# Patient Record
Sex: FEMALE | Race: WHITE | Employment: PART TIME | URBAN - METROPOLITAN AREA
[De-identification: names, ages, dates, MRNs, and addresses within clinical notes are randomized per-mention and may not be internally consistent; named-entity substitution may affect disease eponyms.]

---

## 2017-05-25 ENCOUNTER — OFFICE VISIT (OUTPATIENT)
Dept: OBGYN CLINIC | Age: 27
End: 2017-05-25

## 2017-05-25 VITALS
SYSTOLIC BLOOD PRESSURE: 114 MMHG | BODY MASS INDEX: 24.45 KG/M2 | HEIGHT: 63 IN | WEIGHT: 138 LBS | DIASTOLIC BLOOD PRESSURE: 60 MMHG

## 2017-05-25 DIAGNOSIS — Z01.419 ENCOUNTER FOR WELL WOMAN EXAM WITH ROUTINE GYNECOLOGICAL EXAM: Primary | ICD-10-CM

## 2017-05-25 RX ORDER — CHOLECALCIFEROL (VITAMIN D3) 125 MCG
CAPSULE ORAL
COMMUNITY
End: 2018-03-20 | Stop reason: ALTCHOICE

## 2017-05-25 RX ORDER — NORETHINDRONE ACETATE AND ETHINYL ESTRADIOL 1; .02 MG/1; MG/1
1 TABLET ORAL DAILY
Qty: 3 PACKAGE | Refills: 4 | Status: SHIPPED | OUTPATIENT
Start: 2017-05-25 | End: 2018-03-20 | Stop reason: ALTCHOICE

## 2017-05-25 RX ORDER — BUSPIRONE HYDROCHLORIDE 10 MG/1
10 TABLET ORAL 3 TIMES DAILY
COMMUNITY
End: 2018-03-20 | Stop reason: ALTCHOICE

## 2017-05-25 RX ORDER — NORETHINDRONE ACETATE AND ETHINYL ESTRADIOL 1; .02 MG/1; MG/1
TABLET ORAL
COMMUNITY
End: 2017-05-25

## 2017-05-25 RX ORDER — GLUCOSAM/CHONDRO/HERB 149/HYAL 750-100 MG
TABLET ORAL
COMMUNITY
End: 2020-01-24

## 2017-05-25 RX ORDER — PRAZOSIN HYDROCHLORIDE 2 MG/1
2 CAPSULE ORAL
COMMUNITY
End: 2019-06-07

## 2017-05-25 RX ORDER — TRAZODONE HYDROCHLORIDE 50 MG/1
TABLET ORAL
COMMUNITY
End: 2020-11-05

## 2017-05-25 RX ORDER — METHYLPHENIDATE HYDROCHLORIDE 20 MG/1
CAPSULE, EXTENDED RELEASE ORAL
COMMUNITY
End: 2018-03-20 | Stop reason: ALTCHOICE

## 2017-05-25 RX ORDER — LAMOTRIGINE 300 MG/1
TABLET, EXTENDED RELEASE ORAL DAILY
COMMUNITY
End: 2019-06-07

## 2017-05-25 NOTE — PATIENT INSTRUCTIONS
Learning About Birth Control  What is birth control? Birth control is any method used to prevent pregnancy. Another word for birth control is contraception. If you have sex without birth control, there is a chance that you could get pregnant. This is true even if you have not started having periods yet or you are getting close to menopause. The only sure way to prevent pregnancy is to not have sex. But finding a good method of birth control that you are comfortable with can help you avoid an unplanned pregnancy. Be sure to tell your doctor about any health problems you have or medicines you take. He or she can help you choose the birth control method that is right for you. What are the types of birth control? There are many different kinds of birth control. Each has pros and cons. Learning about all the methods will help you find one that is right for you. · Hormonal methods are very good at preventing pregnancy. Combination birth control pills (\"the pill\"), skin patches, and vaginal rings release the hormones estrogen and progestin. Shots, mini-pills, and implants release progestin only. · Intrauterine devices (IUDs) are also very good at preventing pregnancy. A doctor must place the IUD in your uterus. There are two main types of IUDs available, the copper IUD and the hormonal IUD. The hormonal IUD releases progestin. · Barrier methods generally do not prevent pregnancy as well as IUDs or hormonal methods do. Barrier methods include condoms, diaphragms, cervical caps, and sponges. You must use barrier methods every time you have sex. · Natural family planning can work if you and your partner are very careful and you have a regular ovulation cycle. You will need to keep good records so you know when you are most likely to become pregnant (you are fertile). And during times you are fertile, you will need to not have sex or to use a barrier method.  Natural family planning is also known as fertility awareness and the rhythm method. · Permanent birth control (sterilization) gives you lasting protection against pregnancy. A man can have a vasectomy, or a woman can have her tubes tied (tubal ligation) or blocked (tubal implant). But this is only a good choice if you are sure that you don't want any (or any more) children. · Emergency contraception, such as the morning-after pill (Plan B), is a backup method to prevent pregnancy if you forget to use birth control or a condom breaks. You can use emergency contraception for up to 5 days after having had sex, but it works best if you take it right away. It is a good idea to keep emergency birth control on hand as backup protection. You can get emergency contraception without a prescription at most drugstores. How do you choose the best method? The best method of birth control is one that protects you every time you have sex. This usually depends on how well you use it. To find a method that will work best for you, think about:  · How well it works. Think about how important it is to you to avoid pregnancy. Then look at how well each method works. For example, if you plan to have a child soon anyway, you may not need a very reliable method. If you don't want children but feel it is wrong to end a pregnancy, choose a type of birth control that works very well. · How much effort it takes. For example, birth control pills may not be a good choice if you often forget to take medicine. Or, if you are not sure you will stop and use a barrier method each time you have sex, pick another method. · How much the method costs. For example, condoms are cheap or free in some clinics. Some insurance companies cover the cost of prescription birth control. But cost can sometimes be misleading. An IUD costs a lot up front. But it works for years, making it low-cost over time. · Whether it protects you from infection.  Latex condoms can help protect you from sexually transmitted infections (STIs), such as herpes or HIV/AIDS. But they are not the best way to prevent pregnancy. To avoid both STIs and pregnancy, use condoms along with another type of birth control. · Whether you've had a problem with one kind of birth control. Finding the best method of birth control may involve trying something different. Also, you may need to change a method that once worked well for you. · Whether you want children. If you are positive you don't want children, a lasting method of birth control might be best.  · Your health issues. Some birth control methods may not be safe for you, depending on your health issues. For example, women who smoke, are breastfeeding, or have had breast cancer may not be able to use certain methods. How can you get birth control? · You can buy:  ¨ Condoms, sponges, and spermicides without a prescription in drugstores, online, and in many grocery stores. ¨ Emergency contraception without a prescription at most drugstores. · You need to see a doctor to:  ¨ Get a prescription for birth control pills and other methods that use hormones. ¨ Have an IUD or implant inserted. ¨ Be fitted for a diaphragm or cervical cap. Where can you learn more? Go to http://diya-elaine.info/. Enter E760 in the search box to learn more about \"Learning About Birth Control. \"  Current as of: May 30, 2016  Content Version: 11.2  © 5053-4006 [employer] Care instructions adapted under license by [employer] (which disclaims liability or warranty for this information). If you have questions about a medical condition or this instruction, always ask your healthcare professional. [place] [address] any warranty or liability for your use of this information.
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
## 2017-05-30 LAB
CYTOLOGIST CVX/VAG CYTO: NORMAL
CYTOLOGY CVX/VAG DOC THIN PREP: NORMAL
CYTOLOGY HISTORY:: NORMAL
DX ICD CODE: NORMAL
LABCORP, 190119: NORMAL
Lab: NORMAL
OTHER STN SPEC: NORMAL
PATH REPORT.FINAL DX SPEC: NORMAL
STAT OF ADQ CVX/VAG CYTO-IMP: NORMAL

## 2017-07-20 ENCOUNTER — OFFICE VISIT (OUTPATIENT)
Dept: OBGYN CLINIC | Age: 27
End: 2017-07-20

## 2017-07-20 VITALS
SYSTOLIC BLOOD PRESSURE: 92 MMHG | WEIGHT: 137 LBS | DIASTOLIC BLOOD PRESSURE: 58 MMHG | BODY MASS INDEX: 24.27 KG/M2 | HEART RATE: 107 BPM | HEIGHT: 63 IN

## 2017-07-20 DIAGNOSIS — Z30.430 ENCOUNTER FOR IUD INSERTION: Primary | ICD-10-CM

## 2017-07-20 DIAGNOSIS — Z30.432 ENCOUNTER FOR IUD REMOVAL: ICD-10-CM

## 2017-07-20 DIAGNOSIS — Z32.02 NEGATIVE PREGNANCY TEST: ICD-10-CM

## 2017-07-20 DIAGNOSIS — T83.32XA MALPOSITIONED IUD, INITIAL ENCOUNTER: ICD-10-CM

## 2017-07-20 LAB
HCG URINE, QL. (POC): NEGATIVE
VALID INTERNAL CONTROL?: YES

## 2017-07-20 NOTE — PATIENT INSTRUCTIONS
Intrauterine Device (IUD) Insertion: Care Instructions  Your Care Instructions    The intrauterine device (IUD) is a very effective method of birth control. It is a small, plastic, T-shaped device that contains copper or hormones. The doctor inserts the IUD into your uterus. A plastic string tied to the end of the IUD hangs down through the cervix into the vagina. There are two types of IUDs. The copper IUD is effective for up to 10 years. The hormonal IUD is effective for either 3 years or 5 years, depending on which IUD is used. The hormonal IUD also reduces menstrual bleeding and cramping. Both types of IUD damage or kill the man's sperm. This means that the woman's egg does not join with the sperm. IUDs also change the lining of the uterus so that the egg does not lodge there. The IUD is most likely to work well for women who have been pregnant before. Some women who have never been pregnant have more trouble keeping the IUD in the uterus. They also may have more pain and cramping after insertion. Follow-up care is a key part of your treatment and safety. Be sure to make and go to all appointments, and call your doctor if you are having problems. It's also a good idea to know your test results and keep a list of the medicines you take. How can you care for yourself at home? · You may experience some mild cramping and light bleeding (spotting) for 1 or 2 days. Use a hot water bottle or a heating pad set on low on your belly for pain. · Take an over-the-counter pain medicine, such as acetaminophen (Tylenol), ibuprofen (Advil, Motrin), and naproxen (Aleve) if needed. Read and follow all instructions on the label. · Do not take two or more pain medicines at the same time unless the doctor told you to. Many pain medicines have acetaminophen, which is Tylenol. Too much acetaminophen (Tylenol) can be harmful. · Check the string of your IUD after every period.  To do this, insert a finger into your vagina and feel for the cervix, which is at the top of the vagina and feels harder than the rest of your vagina. You should be able to feel the thin, plastic string coming out of the opening of your cervix. If you cannot feel the string, use another form of birth control and make an appointment with your doctor to have the string checked. · If the IUD comes out, save it and call your doctor. Be sure to use another form of birth control while the IUD is out. · Use latex condoms to protect against sexually transmitted infections (STIs), such as gonorrhea and chlamydia. An IUD does not protect you from STIs. Having one sex partner (who does not have STIs and does not have sex with anyone else) is a good way to avoid STIs. When should you call for help? Call 911 anytime you think you may need emergency care. For example, call if:  · You passed out (lost consciousness). · You have sudden, severe pain in your belly or pelvis. Call your doctor now or seek immediate medical care if:  · You have new belly or pelvic pain. · You have severe vaginal bleeding. This means that you are soaking through your usual pads or tampons each hour for 2 or more hours. · You are dizzy or lightheaded, or you feel like you may faint. · You have a fever and pelvic pain or vaginal discharge. · You have pelvic pain that is getting worse. Watch closely for changes in your health, and be sure to contact your doctor if:  · You cannot feel the string, or the IUD comes out. · You feel sick to your stomach, or you vomit. · You think you may be pregnant. Where can you learn more? Go to http://diya-elaine.info/. Enter Z716 in the search box to learn more about \"Intrauterine Device (IUD) Insertion: Care Instructions. \"  Current as of: March 16, 2017  Content Version: 11.3  © 7413-8394 [employer].  Care instructions adapted under license by [employer] (which disclaims liability or warranty for this information). If you have questions about a medical condition or this instruction, always ask your healthcare professional. [place] [address] any warranty or liability for your use of this information.

## 2017-07-20 NOTE — PROGRESS NOTES
H/o menorrhagia, dysmenorrhea. Improved on OCPs but would like IUD. Discussed at [address] in May. Zayra Adam IUD placed, but had significant cramping. US showed IUD in endometrial cavity but in \"Y\" configuration -> removed (see notes below)  Will continue OCPs for now. May consider Nuvaring. Will call if interested in trying insertion again. Would do under US guidance. [facility]  OFFICE PROCEDURE PROGRESS NOTE        Chart reviewed for the following:   Ashli Schwartz, have reviewed the History, Physical and updated the Allergic reactions for [address] performed immediately prior to start of procedure:   I, Fantasma Bustamante, have performed the following reviews on [employer] prior to the start of the procedure:            * Patient was identified by name and date of birth   * Agreement on procedure being performed was verified  * Risks and Benefits explained to the patient  * Procedure site verified and marked as necessary  * Patient was positioned for comfort  * Consent was signed and verified     Time: 1130      Date of procedure: 7/20/2017    Procedure performed by:  Kimani Riley MD    Provider assisted by: ZAYDA Mondragon    Patient assisted by: self    How tolerated by patient: tolerated the procedure well with no complications    Post Procedural Pain Scale: 2 - Hurts Little Bit    Comments: none        KYLEENA IUD INSERTION  Indications:  [employer] is a [de-identified] P5,  32 y.o. female [facility] Patient's last menstrual period was 07/19/2017 (exact date). Her LMP was normal in duration and amount of flow. She  presents for insertion of an IUD. On placebo pills now. The risks, benefits and alternatives of IUD insertion were discussed in detail at last visit. She also has reviewed [facility] information. She has elected to proceed with the insertion today and she states she has no further questions.  A urine pregnancy test was negative   Procedure: The pelvic exam revealed normal external genitalia. On bimanual exam the uterus was midposition and normal in size with no tenderness present. A speculum was inserted into the vagina and the cervix was visualized. The cervix was prepped with zephiran solution. The anterior lip of the cervix was grasped with a single toothed tenaculum after infiltrating with 2cc 1% lidocaine. The uterus was sounded with a Erickson sound to 6.5 centimeters. Shaila Chute was then inserted without difficulty. The string was cut to 4 centimeters. She experienced a moderate  amount of cramping. Post Procedure Status:   She tolerated the procedure with significant discomfort. She continued to have significant cramping. An US was performed which showed the IUD to be in the endometrial cavity, but in a \"Y\" configuration. Recommendation made to remove, she agrees. IUD REMOVAL    Procedure: The patient was placed in a dorsal lithotomy position. A speculum exam was performed and the cervix was visualized. The IUD string was visualized. Using ring forceps , the string was grasped and the IUD removed intact. The IUD was shown to the patient. The patient received KYLEENA lot number [id number] and EXP: 02/2019.

## 2017-09-14 ENCOUNTER — OFFICE VISIT (OUTPATIENT)
Dept: OBGYN CLINIC | Age: 27
End: 2017-09-14

## 2017-09-14 DIAGNOSIS — Z30.430 ENCOUNTER FOR IUD INSERTION: Primary | ICD-10-CM

## 2017-09-14 NOTE — PROGRESS NOTES
Jany Snell inserted in July. Had significant cramping at the time of insertion. US done before pt left office showed IUD intrauterine, but \"Y\" configuration. Removed before she left that day. Returns today to reattempt insertion. Took Advil 800mg 2hr prior. [facility]  OFFICE PROCEDURE PROGRESS NOTE        Chart reviewed for the following:   Clearence Gaucher, have reviewed the History, Physical and updated the Allergic reactions for [address] performed immediately prior to start of procedure:   Ishmael DIAZ, have performed the following reviews on [employer] prior to the start of the procedure:            * Patient was identified by name and date of birth   * Agreement on procedure being performed was verified  * Risks and Benefits explained to the patient  * Procedure site verified and marked as necessary  * Patient was positioned for comfort  * Consent was signed and verified     Time: 9721      Date of procedure: 9/14/2017    Procedure performed by:  Ed Almonte MD    Provider assisted by: ZAYDA Mondragon    Patient assisted by: self    How tolerated by patient: tolerated the procedure well with no complications    Post Procedural Pain Scale: 2 - Hurts Little Bit    Comments: none      KYLEENA IUD INSERTION  Indications:  [employer] is a [de-identified] P5,  32 y.o. female [facility] Patient's last menstrual period was 09/13/2017 (exact date). Her LMP was normal in duration and amount of flow. She  presents for insertion of an IUD. The risks, benefits and alternatives of IUD insertion were discussed in detail at last visit. She also has reviewed [facility] information. She has elected to proceed with the insertion today and she states she has no further questions. Procedure: The pelvic exam revealed normal external genitalia. On bimanual exam the uterus was anteverted and normal in size with no tenderness present.  A speculum was inserted into the vagina and the cervix was visualized. The cervix was prepped with zephiran solution. The anterior lip of the cervix was grasped with a single toothed tenaculum after infiltrating with 2cc 1% lidocaine. The uterus was sounded with a flexible Mirena sound to 6 centimeters. Swetha Ort was then inserted without difficulty under US guidance. The string was cut to 4 centimeters. She experienced a mild  amount of cramping. Post Procedure Status:   She tolerated the procedure with mild discomfort. The patient was observed for 15 minutes after the insertion. There were no complications. On US imaging, IUD within endometrial cavity. Upon final manipulation of 3D image (after pt had left), IUD with slight \"Y\" shape (much less pronounced than previous IUD). Patient was discharged in stable condition. The patient received KYLEENA lot number [id number] and EXP: 02/2019.

## 2017-09-14 NOTE — PATIENT INSTRUCTIONS
Intrauterine Device (IUD) Insertion: Care Instructions  Your Care Instructions    The intrauterine device (IUD) is a very effective method of birth control. It is a small, plastic, T-shaped device that contains copper or hormones. The doctor inserts the IUD into your uterus. A plastic string tied to the end of the IUD hangs down through the cervix into the vagina. There are two types of IUDs. The copper IUD is effective for up to 10 years. The hormonal IUD is effective for either 3 years or 5 years, depending on which IUD is used. The hormonal IUD also reduces menstrual bleeding and cramping. Both types of IUD damage or kill the man's sperm. This means that the woman's egg does not join with the sperm. IUDs also change the lining of the uterus so that the egg does not lodge there. The IUD is most likely to work well for women who have been pregnant before. Some women who have never been pregnant have more trouble keeping the IUD in the uterus. They also may have more pain and cramping after insertion. Follow-up care is a key part of your treatment and safety. Be sure to make and go to all appointments, and call your doctor if you are having problems. It's also a good idea to know your test results and keep a list of the medicines you take. How can you care for yourself at home? · You may experience some mild cramping and light bleeding (spotting) for 1 or 2 days. Use a hot water bottle or a heating pad set on low on your belly for pain. · Take an over-the-counter pain medicine, such as acetaminophen (Tylenol), ibuprofen (Advil, Motrin), and naproxen (Aleve) if needed. Read and follow all instructions on the label. · Do not take two or more pain medicines at the same time unless the doctor told you to. Many pain medicines have acetaminophen, which is Tylenol. Too much acetaminophen (Tylenol) can be harmful. · Check the string of your IUD after every period.  To do this, insert a finger into your vagina and feel for the cervix, which is at the top of the vagina and feels harder than the rest of your vagina. You should be able to feel the thin, plastic string coming out of the opening of your cervix. If you cannot feel the string, use another form of birth control and make an appointment with your doctor to have the string checked. · If the IUD comes out, save it and call your doctor. Be sure to use another form of birth control while the IUD is out. · Use latex condoms to protect against sexually transmitted infections (STIs), such as gonorrhea and chlamydia. An IUD does not protect you from STIs. Having one sex partner (who does not have STIs and does not have sex with anyone else) is a good way to avoid STIs. When should you call for help? Call 911 anytime you think you may need emergency care. For example, call if:  · You passed out (lost consciousness). · You have sudden, severe pain in your belly or pelvis. Call your doctor now or seek immediate medical care if:  · You have new belly or pelvic pain. · You have severe vaginal bleeding. This means that you are soaking through your usual pads or tampons each hour for 2 or more hours. · You are dizzy or lightheaded, or you feel like you may faint. · You have a fever and pelvic pain or vaginal discharge. · You have pelvic pain that is getting worse. Watch closely for changes in your health, and be sure to contact your doctor if:  · You cannot feel the string, or the IUD comes out. · You feel sick to your stomach, or you vomit. · You think you may be pregnant. Where can you learn more? Go to http://diya-elaine.info/. Enter E331 in the search box to learn more about \"Intrauterine Device (IUD) Insertion: Care Instructions. \"  Current as of: March 16, 2017  Content Version: 11.3  © 7161-5390 [employer].  Care instructions adapted under license by [employer] (which disclaims liability or warranty for this information). If you have questions about a medical condition or this instruction, always ask your healthcare professional. [place] [address] any warranty or liability for your use of this information.

## 2017-10-12 ENCOUNTER — OFFICE VISIT (OUTPATIENT)
Dept: OBGYN CLINIC | Age: 27
End: 2017-10-12

## 2017-10-12 VITALS
HEART RATE: 83 BPM | SYSTOLIC BLOOD PRESSURE: 112 MMHG | HEIGHT: 63 IN | DIASTOLIC BLOOD PRESSURE: 66 MMHG | BODY MASS INDEX: 22.15 KG/M2 | WEIGHT: 125 LBS

## 2017-10-12 DIAGNOSIS — Z30.431 SURVEILLANCE OF (INTRAUTERINE) CONTRACEPTIVE DEVICE: Primary | ICD-10-CM

## 2017-10-12 NOTE — PROGRESS NOTES
IUD followup note    This is a follow-up visit for Ranjana Vinson is a [de-identified] ,  32 y.o. female [facility] Patient's last menstrual period was 10/09/2017 (exact date). .     She had an [place] IUD placed on 9/14/17. H/o menorrhagia, dysmenorrhea. [place] placed 7/20/17. Had significant cramping at the time of insertion. US done, showed IUD in endometrial cavity, but in \"Y\" configuration. Removed before she left the office. Returned on 9/14/17 for insertion under [address] guidance. US showed IUD at the fundus, but still with slight \"Y\" configuration. Much less than with initial attempt. She was not having any significant cramping. Since then she has done well. Had spotting. On period now, still a little heavy. Not having cramping now. Ultrasound was done today and revealed appropriate placement of the IUD in the endometrial cavity. Images reviewed. May have very slight \"Y\" shape, but at fundus. UTERUS IS ANTEVERTED, NORMAL IN SIZE AND ECHOGENICITY. ENDOMETRIUM MEASURES 4MM IN THICKNESS. NO EVIDENCE OF MASS OR ABNORMALITY SEEN WITHIN  THE ENDOMETRIAL CAVITY. IUD IS SEEN IN THE PROPER POSITION WITHIN THE ENDOMETRIAL CAVITY IN THE UTERINE FUNDUS. RIGHT OVARY APPEARS WITHIN NORMAL LIMITS. LEFT OVARY APPEARS WITHIN NORMAL LIMITS. NO FREE FLUID SEEN IN THE CDS.     Past Medical History:   Diagnosis Date    Acne     Anxiety     Arm pain     Back pain     Confusion     Depression     Gynecologic exam normal 2017    Dr. Staci Myrick Headache     Memory loss     Neck pain     OAB (overactive bladder)     Resolved     Recurrent UTI April and July 2012    Has symtoms of urgency and frequency in between    Routine Papanicolaou smear 05/25/2017    Negative (no hpv)      Past Surgical History:   Procedure Laterality Date    HX WISDOM TEETH EXTRACTION  2008     Social History     Occupational History    Unemployed Other     Recently graduated     Social History Main Topics    Smoking status: Never Smoker    Smokeless tobacco: Never Used      Comment: Never used vapor or e-cigs     Alcohol use 1.0 oz/week     1 Glasses of wine, 1 Standard drinks or equivalent per week      Comment: wine/week    Drug use: No    Sexual activity: Yes     Partners: Male     Birth control/ protection: Pill     Family History   Problem Relation Age of Onset    Breast Cancer Mother 43    Heart Disease Father     Alcohol abuse Maternal Uncle     Diabetes Maternal Grandmother     Heart Disease Maternal Grandmother     Thyroid Disease Maternal Grandmother     Alcohol abuse Maternal Grandmother     Cancer Maternal Grandfather     Alcohol abuse Maternal Grandfather     Liver Disease Maternal Grandfather     Other Sister      GYN problems    MS Paternal Uncle        Allergies   Allergen Reactions    Levoxyl [Levothyroxine] Swelling     Reports facial swelling     Tylenol [Acetaminophen] Other (comments)     Night terrors     Prior to Admission medications    Medication Sig Start Date End Date Taking? Authorizing Provider   VORTIOXETINE HYDROBROMIDE (TRINTELLIX PO) Take  by mouth. Yes Historical Provider   levonorgestrel [facility]) 17.5 mcg/24 hr (5 years) IUD 1 Device by IntraUTERine route daily. Indications: Office supplied 7/20/17  Yes Isadora Santos MD   methylphenidate (RITALIN LA) 20 mg LA capsule Take by mouth every morning. Yes Historical Provider   CLOMIPRAMINE HCL (CLOMIPRAMINE PO) Take  by mouth. Yes Historical Provider   prazosin (MINIPRESS) 2 mg capsule Take 2 mg by mouth nightly. Yes Historical Provider   traZODone (DESYREL) 50 mg tablet Take  by mouth nightly. Yes Historical Provider   lamoTRIgine (LAMICTAL XR) 300 mg tr24 ER tablet Take  by mouth daily. Yes Historical Provider   cholecalciferol, vitamin D3, (VITAMIN D3) 2,000 unit tab Take  by mouth. Yes Historical Provider   Omega-3-DHA-EPA-Fish Oil (FISH OIL) 1,000 mg (120 mg-180 mg) cap Take  by mouth.    Yes Historical Provider   FLUoxetine (PROZAC) 20 mg tablet Take 1 Tab by mouth daily. 1/18/16  Yes Dinesh Henao NP   clonazePAM (KLONOPIN) 0.5 mg tablet Take 1 Tab by mouth nightly as needed. Max Daily Amount: 0.5 mg. 12/9/15  Yes Dinesh Henao NP   cranberry extract 450 mg Tab Take  by mouth. Yes Historical Provider   lactobacillus rhamnosus gg 10 billion cell (PROBIOTIC) 10 billion cell capsule Take 1 Cap by mouth daily. 10/2/12  Yes Annika Conte MD   busPIRone (BUSPAR) 10 mg tablet Take 10 mg by mouth three (3) times daily. Historical Provider   norethindrone-ethinyl estradiol (JUNEL 1/20, 21,) 1-20 mg-mcg tab Take 1 Tab by mouth daily.  5/25/17   Amelia Holbrook MD          Objective:  Visit Vitals    /66    Pulse 83    Ht 5' 3\" (1.6 m)    Wt 125 lb (56.7 kg)    LMP 10/09/2017 (Exact Date)    BMI 22.14 kg/m2       Physical Exam:   PHYSICAL EXAMINATION    Constitutional  · Appearance: well-nourished, well developed, alert, in no acute distress    Gastrointestinal  · Abdominal Examination: abdomen non-tender to palpation, no masses present  · Liver and spleen: no hepatomegaly present, spleen not palpable  · Hernias: no hernias identified    Genitourinary  · External Genitalia: normal appearance for age, no discharge present, no tenderness present, no inflammatory lesions present, no masses present, no atrophy present  · Vagina: normal vaginal vault without central or paravaginal defects, no discharge present, no inflammatory lesions present, no masses present; scant menstrual blood  · Bladder: non-tender to palpation  · Urethra: appears normal  · Cervix: normal with IUD string visible and appropriate length   · Uterus: normal size, shape and consistency  · Adnexa: no adnexal tenderness present, no adnexal masses present  · Perineum: perineum within normal limits, no evidence of trauma, no rashes or skin lesions present    Skin  · General Inspection: no rash, no lesions identified    Neurologic/Psychiatric  · Mental Status:  · Orientation: grossly oriented to person, place and time  · Mood and Affect: mood normal, affect appropriate    Assessment:  Doing well with expected mild irregular bleeding. IUD at fundus, slight \"Y\" shape? Plan:   RTO for AE as scheduled. Discussed IUD should control sx even if slight \"Y\" shape. No intervention needed unless symptomatic (pain, bleeding).

## 2017-10-12 NOTE — PATIENT INSTRUCTIONS
Learning About Birth Control: Intrauterine Device (IUD)  What is an intrauterine device (IUD)? The intrauterine device (IUD) is used to prevent pregnancy. It's a small, plastic, T-shaped device. Your doctor places the IUD in your uterus. You have a choice between a hormonal IUD and a copper IUD. The hormonal IUD prevents pregnancy by damaging or killing sperm. It also releases a type of the hormone progestin. Progestin prevents pregnancy in these ways: It thickens the mucus in the cervix. This makes it hard for sperm to travel into the uterus. It also thins the lining of the uterus, which makes it harder for a fertilized egg to attach to the uterus. Progestin can sometimes stop the ovaries from releasing an egg each month (ovulation). Hormonal IUDs prevent pregnancy for 3 to 5 years, depending on which IUD is used. Once you have it, you don't have to do anything else to prevent pregnancy. The copper IUD is wrapped in copper wire. Copper IUDs prevent pregnancy by making the uterus and fallopian tubes produce a fluid that kills sperm. The copper IUD prevents pregnancy for 10 years. Once you have it, you don't have to do anything else to prevent pregnancy. A string tied to the end of the IUD hangs down through the opening of the uterus (called the cervix) into the vagina. You can check that the IUD is in place by feeling for the string. The IUD usually stays in the uterus until your doctor removes it. How well does it work? In the first year of use:  · When the hormonal IUD is used exactly as directed, fewer than 1 woman out of 100 has an unplanned pregnancy. · When the copper IUD is used exactly as directed, fewer than 1 woman out of 100 has an unplanned pregnancy. Be sure to tell your doctor about any health problems you have or medicines you take. He or she can help you choose the birth control method that is right for you. What are the advantages of an IUD?   · An IUD is one of the most effective methods of birth control. · It prevents pregnancy for 3 to 10 years, depending on the type. You don't have to worry about birth control during this time. · It's safe to use while breastfeeding. · IUDs don't contain estrogen. So you can use an IUD if you don't want to take estrogen or can't take estrogen because you have certain health problems or concerns. · An IUD is convenient. It is always providing birth control. You don't need to remember to take a pill or get a shot. You don't have to interrupt sex to protect against pregnancy. · A hormonal IUD may reduce heavy bleeding and cramping. What are the disadvantages of an IUD? · An IUD doesn't protect against sexually transmitted infections (STIs), such as herpes or HIV/AIDS. If you aren't sure if your sex partner might have an STI, use a condom to protect against disease. · A copper IUD may cause periods with more bleeding and cramping. · You have to see a doctor to have an IUD inserted and removed. · You have to check to see if the string is in place. Where can you learn more? Go to http://diya-elaine.info/. Enter V944 in the search box to learn more about \"Learning About Birth Control: Intrauterine Device (IUD). \"  Current as of: March 16, 2017  Content Version: 11.3  © 1180-4425 [employer]. Care instructions adapted under license by [employer] (which disclaims liability or warranty for this information). If you have questions about a medical condition or this instruction, always ask your healthcare professional. [place] [address] any warranty or liability for your use of this information.

## 2017-12-27 ENCOUNTER — TELEPHONE (OUTPATIENT)
Dept: OBGYN CLINIC | Age: 27
End: 2017-12-27

## 2017-12-27 NOTE — TELEPHONE ENCOUNTER
Patient is a 32 yr. Old that had a Kyleena placement on 9/14/17. Since the placement she has continued to have heavy bleeding and severe menstrual cramping. She states that in the past she took OCP and did well with them, she just liked the convenience of the IUD. US transvaginal done 10/12/17 and was in proper position. Patient states that Dr. Cherry Likens told her that her cervix was small and that the device barely fit. Do you want to bring patient back in for consultation since it has been passed the 3 month marina or do you have further suggestions? Please advise.

## 2017-12-27 NOTE — TELEPHONE ENCOUNTER
If she wants to have IUD removed, she can make appt just for that and we can switch to OCPs at that visit if she would like. If she wants to try to keep the IUD, then recommend US appt to recheck position. Rec scheduled NSAIDS for 48-72hrs if she hasn't tried this already.

## 2017-12-28 NOTE — TELEPHONE ENCOUNTER
12/28/17- left message on identifiable voicemail the advice by Dr. Christina Wells. Call prn to schedule if decided to do ultrasound.

## 2018-03-20 ENCOUNTER — OFFICE VISIT (OUTPATIENT)
Dept: CARDIOLOGY CLINIC | Age: 28
End: 2018-03-20

## 2018-03-20 VITALS
WEIGHT: 111 LBS | OXYGEN SATURATION: 99 % | HEART RATE: 85 BPM | DIASTOLIC BLOOD PRESSURE: 50 MMHG | RESPIRATION RATE: 16 BRPM | BODY MASS INDEX: 19.67 KG/M2 | SYSTOLIC BLOOD PRESSURE: 100 MMHG | HEIGHT: 63 IN

## 2018-03-20 DIAGNOSIS — E78.00 ELEVATED CHOLESTEROL: Primary | ICD-10-CM

## 2018-03-20 RX ORDER — TEMAZEPAM 30 MG/1
CAPSULE ORAL
COMMUNITY
End: 2020-01-24

## 2018-03-20 RX ORDER — LEVOTHYROXINE SODIUM 75 UG/1
TABLET ORAL
COMMUNITY
End: 2020-01-20 | Stop reason: DRUGHIGH

## 2018-03-20 NOTE — PROGRESS NOTES
Chief Complaint   Patient presents with    New Patient     Elevated chol; past family hx of CAD     1. Have you been to the ER, urgent care clinic since your last visit? Hospitalized since your last visit? No    2. Have you seen or consulted any other health care providers outside of the [address] [place] [address] since your last visit? Include any pap smears or colon screening.  No  Visit Vitals    /50 (BP 1 Location: Right arm, BP Patient Position: Sitting)    Pulse 85    Resp 16    Ht 5' 3\" (1.6 m)    Wt 111 lb (50.3 kg)    LMP 03/20/2018 (LMP Unknown)    SpO2 99%    BMI 19.66 kg/m2
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
## 2018-03-20 NOTE — MR AVS SNAPSHOT
[address] [facility] [address] 
[phone] Patient: Jocelyn Lindquist MRN: [id number] Visit Information Date & Time Provider Department Dept. Phone Encounter #  
 3/20/2018 11:00 AM Subhash Baker MD [facility] Tejada [phone] [id number] Follow-up Instructions Return in about 7 weeks (around 5/8/2018). Your Appointments 5/29/2018 10:10 AM  
ESTABLISHED PATIENT with Ej Bravo MD  
[place] ([address] [place] [address]) Appt Note: ae  
 [address] [facility] [address] [place] [address]  
[phone]  
  
   
 [address] [place] [address]  
  
    
 9/25/2018 11:40 AM  
ESTABLISHED PATIENT with Subhash Baker MD  
[facility] ([facility]) Appt Note: 6 month follow up per Dr. Yareli Branch [address] Upcoming Health Maintenance Date Due DTaP/Tdap/Td series (1 - Tdap) 7/9/2009 Influenza Age 5 to Adult 8/1/2017 PAP AKA CERVICAL CYTOLOGY 5/25/2020 Allergies as of 3/20/2018  Review Complete On: 3/20/2018 By: Subhash Baker MD  
  
 Severity Noted Reaction Type Reactions Levoxyl [Levothyroxine]  05/25/2017    Swelling Reports facial swelling Tylenol [Acetaminophen]  07/13/2012   Side Effect Other (comments) Night terrors Current Immunizations  Never Reviewed No immunizations on file. Not reviewed this visit You Were Diagnosed With   
  
 Codes Comments Elevated cholesterol    -  Primary ICD-10-CM: E78.00 ICD-9-CM: 272.0 Vitals  BP Pulse Resp Height(growth percentile) Weight(growth percentile) LMP  
 100/50 (BP 1 Location: Right arm, BP Patient Position: Sitting) 85 16 5' 3\" (1.6 m) 111 lb (50.3 kg) 03/20/2018 (LMP Unknown) SpO2 BMI OB Status Smoking Status 99% 19.66 kg/m2 IUD Never Smoker Vitals History BMI and BSA Data Body Mass Index Body Surface Area  
 19.66 kg/m 2 1.5 m 2 Preferred Pharmacy Pharmacy Name Phone [address] [facility] [phone] Your Updated Medication List  
  
   
This list is accurate as of 3/20/18 11:56 AM.  Always use your most recent med list.  
  
  
  
  
 clonazePAM 0.5 mg tablet Commonly known as:  Gib Loge Take 1 Tab by mouth nightly as needed. Max Daily Amount: 0.5 mg.  
  
 cranberry extract 450 mg Tab tablet Take  by mouth. FISH OIL 1,000 mg (120 mg-180 mg) capsule Generic drug:  omega 3-DHA-EPA-fish oil Take  by mouth.  
  
 lamoTRIgine 300 mg Tr24 ER tablet Commonly known as: LaMICtal XR Take  by mouth daily. levonorgestrel 17.5 mcg/24 hr (5 years) Iud IUD Commonly known as:  KYLEENA  
1 Device by IntraUTERine route daily. Indications: Office supplied  
  
 prazosin 2 mg capsule Commonly known as:  MINIPRESS Take 2 mg by mouth nightly. PROBIOTIC 10 billion cell capsule Generic drug:  lactobacillus rhamnosus gg 10 billion cell Take 1 Cap by mouth daily. temazepam 30 mg capsule Commonly known as:  RESTORIL Take  by mouth nightly as needed for Sleep.  
  
 traZODone 50 mg tablet Commonly known as:  Abdulaziz Keen Take  by mouth nightly. TRINTELLIX PO Take  by mouth. UNITHROID 75 mcg tablet Generic drug:  levothyroxine Take  by mouth Daily (before breakfast). We Performed the Following AMB POC EKG ROUTINE W/ 12 LEADS, INTER & REP [63221 CPT(R)] Follow-up Instructions Return in about 7 weeks (around 5/8/2018). Introducing [facility]! Dear Terri Valadez: Thank you for requesting a "[employer]" account.   Our records indicate that you already have an active [employer] account. You can access your account anytime at [email or web address] Did you know that you can access your hospital and ER discharge instructions at any time in [employer]? You can also review all of your test results from your hospital stay or ER visit. Additional Information If you have questions, please visit the Frequently Asked Questions section of the [employer] website at [email or web address] Remember, [employer] is NOT to be used for urgent needs. For medical emergencies, dial 911. Now available from your iPhone and Android! Please provide this summary of care documentation to your next provider. Your primary care clinician is listed as Radha Bucio. If you have any questions after today's visit, please call [phone].

## 2018-06-06 ENCOUNTER — OFFICE VISIT (OUTPATIENT)
Dept: OBGYN CLINIC | Age: 28
End: 2018-06-06

## 2018-06-06 VITALS
DIASTOLIC BLOOD PRESSURE: 62 MMHG | WEIGHT: 107 LBS | SYSTOLIC BLOOD PRESSURE: 100 MMHG | HEART RATE: 87 BPM | HEIGHT: 63 IN | BODY MASS INDEX: 18.96 KG/M2

## 2018-06-06 DIAGNOSIS — Z11.3 SCREENING FOR VENEREAL DISEASE: ICD-10-CM

## 2018-06-06 DIAGNOSIS — Z01.419 ENCOUNTER FOR WELL WOMAN EXAM WITH ROUTINE GYNECOLOGICAL EXAM: Primary | ICD-10-CM

## 2018-06-06 DIAGNOSIS — Z30.431 SURVEILLANCE OF (INTRAUTERINE) CONTRACEPTIVE DEVICE: ICD-10-CM
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
## 2018-06-06 NOTE — PATIENT INSTRUCTIONS

## 2018-06-08 LAB
C TRACH RRNA SPEC QL NAA+PROBE: NEGATIVE
HBV SURFACE AG SERPL QL IA: NEGATIVE
HCV AB S/CO SERPL IA: <0.1 S/CO RATIO (ref 0–0.9)
HIV 1+2 AB+HIV1 P24 AG SERPL QL IA: NON REACTIVE
HSV1 IGG SER IA-ACNC: <0.91 INDEX (ref 0–0.9)
HSV2 IGG SER IA-ACNC: <0.91 INDEX (ref 0–0.9)
N GONORRHOEA RRNA SPEC QL NAA+PROBE: NEGATIVE
T PALLIDUM AB SER QL IA: NEGATIVE
T VAGINALIS RRNA SPEC QL NAA+PROBE: NEGATIVE

## 2018-08-29 ENCOUNTER — TELEPHONE (OUTPATIENT)
Dept: OBGYN CLINIC | Age: 28
End: 2018-08-29

## 2018-08-29 NOTE — TELEPHONE ENCOUNTER
Can use OTC Monistat 3 or 7. What abx is she taking? If she is doing a full course of abx, she may need to repeat a course of Monistat once she is done with the abx.

## 2018-08-29 NOTE — TELEPHONE ENCOUNTER
Patient is calling to report that she has a lot of vaginal itching and discomfort. No discharge. The itching started about 2 days ago. She is taking an antibiotic now for post coital intercourse and she has recently started them back up. Patient requested appointment with you today or to get advice on what you would try. No appointments are available with you for remainder of week. Patient has not tried otc product. Monistat 7 day okay to try, patient wanted me to ask.

## 2018-08-29 NOTE — TELEPHONE ENCOUNTER
I spoke with patient and she agreed that she will try the Monistat 3. She was advised that the 3 day may not be enough treatment that some people do better with the 7 day. She was also informed about probiotics. She is taking Macrobid 100 mg post coital.  She said that she will try to rid of the yeast this time and try it again to see if it returns. She got the Macrobid for post coital usage by her PCP. She said if the yeast returns she will consult with you about change of med.

## 2018-08-29 NOTE — TELEPHONE ENCOUNTER
2:00 patient called back with follow-up question    Spoke with patient and around the labia internally she found a swollen area that she can not tell much about right now since she is at work. She said it feels like a little lumpy area. Advised doing warm soaks in plain bath water for several days and if not improving, needs to be seen.

## 2018-08-31 ENCOUNTER — TELEPHONE (OUTPATIENT)
Dept: OBGYN CLINIC | Age: 28
End: 2018-08-31

## 2018-08-31 ENCOUNTER — OFFICE VISIT (OUTPATIENT)
Dept: OBGYN CLINIC | Age: 28
End: 2018-08-31

## 2018-08-31 VITALS
DIASTOLIC BLOOD PRESSURE: 64 MMHG | BODY MASS INDEX: 18.89 KG/M2 | HEIGHT: 63 IN | WEIGHT: 106.6 LBS | SYSTOLIC BLOOD PRESSURE: 107 MMHG

## 2018-08-31 DIAGNOSIS — Z11.3 SCREEN FOR STD (SEXUALLY TRANSMITTED DISEASE): ICD-10-CM

## 2018-08-31 DIAGNOSIS — N90.89 VULVAR IRRITATION: ICD-10-CM

## 2018-08-31 DIAGNOSIS — N90.89 VULVAR LESION: Primary | ICD-10-CM

## 2018-08-31 LAB
PH BODY FLUID, POCT, PHBFPOCT: 4.5
SOURCE POCT, SRCEPOCT: NORMAL
WET MOUNT POCT, WMPOCT: NEGATIVE

## 2018-08-31 RX ORDER — BISMUTH SUBSALICYLATE 262 MG
1 TABLET,CHEWABLE ORAL DAILY
COMMUNITY

## 2018-08-31 NOTE — TELEPHONE ENCOUNTER
Patient calling stating that she has a left labial lump/bump that is tender and no relief with sitz baths and wanted to be seen.     Patient placed on DM's schedule for eval.

## 2018-08-31 NOTE — PATIENT INSTRUCTIONS
Vulvar Dermatitis: Care Instructions  Your Care Instructions  Vulvar dermatitis happens when the soft folds of skin around the opening of the vagina become red, painful, and itchy. Dermatitis can be caused by heat or wetness or can be a reaction to scented soaps, powders, creams, toilet paper, spermicides, or clothing. A skin condition, such as eczema, also can cause dermatitis. Your doctor may do tests to find out what is causing your symptoms. You can treat symptoms of vulvar dermatitis with medicine and home treatment. Try not to scratch your rash. Scratching can make the rash last longer or get worse. Follow-up care is a key part of your treatment and safety. Be sure to make and go to all appointments, and call your doctor if you are having problems. It's also a good idea to know your test results and keep a list of the medicines you take. How can you care for yourself at home? · Use your medicine exactly as prescribed. Call your doctor if you think you are having a problem with your medicine. Tell your doctor if you are taking other prescription or over-the-counter medicines. · Wash your vaginal area no more than once a day. Use cool water with or without mild soap. Pat dry or use a hair dryer on a low setting. · Do not have sex until you feel better. · Do not douche or use powders or sprays on your vulvar area. · Try not to scratch. Use a cold pack or a cool bath to treat itching. · For severe itching, try an oral antihistamine, such as a nondrowsy one like loratadine (Claritin) or one that might make you sleepy, like diphenhydramine (Benadryl). Read and follow all instructions on the label. · Try sleeping without underwear. · Wear loose cotton clothing. Do not wear nylon or other materials that hold body heat and moisture close to the skin. When should you call for help?   Call your doctor now or seek immediate medical care if:    · You have a fever.     · You have vaginal discharge that smells bad.     · You have burning or pain when you urinate.     · You have increased pain, swelling, warmth, or redness in the vaginal area.    Watch closely for changes in your health, and be sure to contact your doctor if:    · Your rash spreads.     · You have new or increased pain in your vaginal area.     · You have new or increased itching from inside your vagina.     · You do not feel better after 2 or 3 days. Where can you learn more? Go to http://diya-elaine.info/. Enter H247 in the search box to learn more about \"Vulvar Dermatitis: Care Instructions. \"  Current as of: October 6, 2017  Content Version: 11.7  © 0154-8601 [email or web address]. Care instructions adapted under license by [employer] (which disclaims liability or warranty for this information). If you have questions about a medical condition or this instruction, always ask your healthcare professional. [address] any warranty or liability for your use of this information.

## 2018-08-31 NOTE — PROGRESS NOTES
eVulvar lesion evaluation    Jeana Hatch is a 25 y.o. female  Patient's last menstrual period was 08/23/2018 (exact date). who presents with a lesion on her left labia. She noticed it 2-3 days ago. The lesion has shown the following change: Decreased. No new lesions have developed. She reports the following associated symptoms: irritation, itching, minor discomfort    She denies the following associated symptoms:  redness, drainage, vaginal discharge  Aggravating factors: none. Alleviating factors: none. Has become sexually active again. Taking macrobid 100mg postcoital as prophylaxis for UTIs. Pharmacist told her dose was too high. Using Monistat-3, 3rd dose due tonight.   Does not seem to have made any difference with irritation      Past Medical History:   Diagnosis Date    Acne     Anxiety     Arm pain     Back pain     Confusion     Depression     Gynecologic exam normal 2017    Dr. Ronda Thompson Headache     IUD (intrauterine [facility] place [facility]14/2017    [facility]     Memory loss     Neck pain     OAB (overactive bladder)     Resolved     Recurrent UTI April and July 2012    Has symtoms of urgency and frequency in between    Routine Papanicolaou smear 05/25/2017    Negative (no hpv)      Past Surgical History:   Procedure Laterality Date    HX WISDOM TEETH EXTRACTION  2008     Social History     Occupational History    Unemployed Other     Recently graduated     Social History Main Topics    Smoking status: Never Smoker    Smokeless tobacco: Never Used      Comment: Never used vapor or e-cigs     Alcohol use 1.0 oz/week     1 Glasses of wine, 1 Standard drinks or equivalent per week      Comment: wine/week    Drug use: No    Sexual activity: Not Currently     Partners: Male     Birth control/ protection: IUD     Family History   Problem Relation Age of Onset    Breast Cancer Mother 43    Heart Disease Father     Alcohol abuse Maternal Uncle     Diabetes Maternal Grandmother     Heart Disease Maternal Grandmother     Thyroid Disease Maternal Grandmother     Alcohol abuse Maternal Grandmother     Cancer Maternal Grandfather     Alcohol abuse Maternal Grandfather     Liver Disease Maternal Grandfather     Other Sister      GYN problems    MS Paternal Uncle        Allergies   Allergen Reactions    Levoxyl [Levothyroxine] Swelling     Reports facial swelling     Tylenol [Acetaminophen] Other (comments)     Night terrors     Prior to Admission medications    Medication Sig Start Date End Date Taking? Authorizing Provider   multivitamin (ONE A DAY) tablet Take 1 Tab by mouth daily. Yes Historical Provider   temazepam (RESTORIL) 30 mg capsule Take  by mouth nightly as needed for Sleep. Yes Historical Provider   levothyroxine (UNITHROID) 75 mcg tablet Take  by mouth Daily (before breakfast). Yes Historical Provider   VORTIOXETINE HYDROBROMIDE (TRINTELLIX PO) Take  by mouth. Yes Historical Provider   levonorgestrel [facility]) 17.5 mcg/24 hr (5 years) IUD 1 Device by IntraUTERine route daily. Indications: Office supplied 7/20/17  Yes Christina Slaughter MD   prazosin (MINIPRESS) 2 mg capsule Take 2 mg by mouth nightly. Yes Historical Provider   traZODone (DESYREL) 50 mg tablet Take  by mouth nightly. Yes Historical Provider   lamoTRIgine (LAMICTAL XR) 300 mg tr24 ER tablet Take  by mouth daily. Yes Historical Provider   Omega-3-DHA-EPA-Fish Oil (FISH OIL) 1,000 mg (120 mg-180 mg) cap Take  by mouth. Yes Historical Provider   clonazePAM (KLONOPIN) 0.5 mg tablet Take 1 Tab by mouth nightly as needed. Max Daily Amount: 0.5 mg. 12/9/15  Yes Leona Kumar NP   cranberry extract 450 mg Tab Take  by mouth. Yes Historical Provider   lactobacillus rhamnosus gg 10 billion cell (PROBIOTIC) 10 billion cell capsule Take 1 Cap by mouth daily.  10/2/12  Yes Randi Urbina MD          Objective:  Visit Vitals    /64    Ht 5' 3\" (1.6 m)    Wt 106 lb 9.6 oz (48.4 kg)    LMP 08/23/2018 (Exact Date)    BMI 18.88 kg/m2       Physical Exam:   PHYSICAL EXAMINATION    Constitutional  · Appearance: well-nourished, well developed, alert, in no acute distress    Gastrointestinal  · Abdominal Examination: abdomen non-tender to palpation, no masses present  · Liver and spleen: no hepatomegaly present, spleen not palpable  · Hernias: no hernias identified    Genitourinary  · External Genitalia: shaven, normal appearance for age, no discharge present, no tenderness present, no inflammatory lesions present, ?tiny subcutaneous lump (3mm) just lateral to base of left labia no atrophy present  · Vagina: normal vaginal vault without central or paravaginal defects, moderate mixed white/clear discharge present, no odor no inflammatory lesions present, no masses present  · Bladder: non-tender to palpation  · Urethra: appears normal  · Cervix: normal   · Uterus: normal size, shape and consistency  · Adnexa: no adnexal tenderness present, no adnexal masses present  · Perineum: perineum within normal limits, no evidence of trauma, no rashes or skin lesions present  · Anus: anus within normal limits, no hemorrhoids present  · Inguinal Lymph Nodes: no lymphadenopathy present    Skin  · General Inspection: no rash, no lesions identified    Neurologic/Psychiatric  · Mental Status:  · Orientation: grossly oriented to person, place and time  · Mood and Affect: mood normal, affect appropriate  Results for orders placed or performed in visit on 08/31/18   AMB POC PH, BODY FLUID EXCEPT BLOOD   Result Value Ref Range    pH,Body fluid (POC) 4.5     Source     AMB POC SMEAR, STAIN & INTERPRET, WET MOUNT   Result Value Ref Range    Wet mount (POC) negative          Assessment & Plan:   - ?vulvar lump - barely able to appreciate anything on exam, seems to be resolving. Probably was blocked duct.   Cont sitz bath  - can complete last dose of Monistat tonight  - Nuswab G/C/T  - reassured pt that dosing of prophylactic macrobid is appropriate.         Orders Placed This Encounter    CT/NG/T.VAGINALIS AMPLIFICATION     Order Specific Question:   Specimen type     Answer:   Vaginal [516]    AMB POC PH, BODY FLUID EXCEPT BLOOD    AMB POC SMEAR, STAIN & INTERPRET, WET MOUNT

## 2018-09-03 LAB
C TRACH RRNA SPEC QL NAA+PROBE: NEGATIVE
N GONORRHOEA RRNA SPEC QL NAA+PROBE: NEGATIVE
T VAGINALIS RRNA SPEC QL NAA+PROBE: NEGATIVE

## 2018-09-10 NOTE — PROGRESS NOTES
- Left message on machine for patient to call the office or to respond to the [employer] message sent
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
## 2019-06-07 ENCOUNTER — OFFICE VISIT (OUTPATIENT)
Dept: OBGYN CLINIC | Age: 29
End: 2019-06-07

## 2019-06-07 VITALS
WEIGHT: 111 LBS | SYSTOLIC BLOOD PRESSURE: 100 MMHG | BODY MASS INDEX: 19.67 KG/M2 | DIASTOLIC BLOOD PRESSURE: 62 MMHG | HEART RATE: 67 BPM | HEIGHT: 63 IN

## 2019-06-07 DIAGNOSIS — N89.8 VAGINAL DISCHARGE: ICD-10-CM

## 2019-06-07 DIAGNOSIS — Z01.419 ENCOUNTER FOR GYNECOLOGICAL EXAMINATION: Primary | ICD-10-CM

## 2019-06-07 DIAGNOSIS — R10.2 VAGINAL PAIN: ICD-10-CM

## 2019-06-07 DIAGNOSIS — N90.89 VULVAL LESION: ICD-10-CM

## 2019-06-07 RX ORDER — NITROFURANTOIN 25; 75 MG/1; MG/1
100 CAPSULE ORAL
Qty: 30 CAP | Refills: 6 | Status: SHIPPED | OUTPATIENT
Start: 2019-06-07 | End: 2020-01-31 | Stop reason: SDUPTHER

## 2019-06-07 RX ORDER — NITROFURANTOIN 25; 75 MG/1; MG/1
100 CAPSULE ORAL
COMMUNITY
End: 2019-06-07 | Stop reason: SDUPTHER

## 2019-06-07 NOTE — PATIENT INSTRUCTIONS

## 2019-06-12 LAB
A VAGINAE DNA VAG QL NAA+PROBE: NORMAL SCORE
BVAB2 DNA VAG QL NAA+PROBE: NORMAL SCORE
C ALBICANS DNA VAG QL NAA+PROBE: NEGATIVE
C GLABRATA DNA VAG QL NAA+PROBE: NEGATIVE
C TRACH RRNA SPEC QL NAA+PROBE: NEGATIVE
MEGA1 DNA VAG QL NAA+PROBE: NORMAL SCORE
N GONORRHOEA RRNA SPEC QL NAA+PROBE: NEGATIVE
T VAGINALIS RRNA SPEC QL NAA+PROBE: NEGATIVE

## 2019-06-13 LAB
CYTOLOGIST CVX/VAG CYTO: NORMAL
CYTOLOGY CVX/VAG DOC CYTO: NORMAL
CYTOLOGY CVX/VAG DOC THIN PREP: NORMAL
DX ICD CODE: NORMAL
HPV I/H RISK 1 DNA CVX QL PROBE+SIG AMP: NEGATIVE
Lab: NORMAL
OTHER STN SPEC: NORMAL
STAT OF ADQ CVX/VAG CYTO-IMP: NORMAL

## 2019-06-15 LAB
HSV1 DNA SPEC QL NAA+PROBE: NEGATIVE
HSV2 DNA SPEC QL NAA+PROBE: NEGATIVE

## 2020-01-17 ENCOUNTER — HOSPITAL ENCOUNTER (OUTPATIENT)
Dept: LAB | Age: 30
Discharge: HOME | End: 2020-01-17

## 2020-01-17 ENCOUNTER — OFFICE VISIT (OUTPATIENT)
Dept: FAMILY MEDICINE CLINIC | Age: 30
End: 2020-01-17

## 2020-01-17 VITALS
HEIGHT: 63 IN | RESPIRATION RATE: 16 BRPM | TEMPERATURE: 97.5 F | DIASTOLIC BLOOD PRESSURE: 63 MMHG | WEIGHT: 112 LBS | SYSTOLIC BLOOD PRESSURE: 99 MMHG | HEART RATE: 67 BPM | BODY MASS INDEX: 19.84 KG/M2 | OXYGEN SATURATION: 100 %

## 2020-01-17 DIAGNOSIS — E78.5 HYPERLIPIDEMIA, UNSPECIFIED HYPERLIPIDEMIA TYPE: ICD-10-CM

## 2020-01-17 DIAGNOSIS — E03.9 ACQUIRED HYPOTHYROIDISM: ICD-10-CM

## 2020-01-17 DIAGNOSIS — Z82.49 FAMILY HX OF ISCHEM HEART DIS AND OTH DIS OF THE CIRC SYS: ICD-10-CM

## 2020-01-17 DIAGNOSIS — Z91.89 AT HIGH RISK FOR BREAST CANCER: ICD-10-CM

## 2020-01-17 DIAGNOSIS — Z00.00 WELL WOMAN EXAM (NO GYNECOLOGICAL EXAM): Primary | ICD-10-CM

## 2020-01-17 DIAGNOSIS — F32.A DEPRESSION, UNSPECIFIED DEPRESSION TYPE: ICD-10-CM

## 2020-01-17 DIAGNOSIS — R53.83 FATIGUE, UNSPECIFIED TYPE: ICD-10-CM

## 2020-01-17 DIAGNOSIS — F41.9 ANXIETY: ICD-10-CM

## 2020-01-17 DIAGNOSIS — Z76.89 ESTABLISHING CARE WITH NEW DOCTOR, ENCOUNTER FOR: ICD-10-CM

## 2020-01-17 DIAGNOSIS — S06.9X9S TRAUMATIC BRAIN INJURY WITH LOSS OF CONSCIOUSNESS, SEQUELA (HCC): ICD-10-CM

## 2020-01-17 DIAGNOSIS — L30.8 PAPULAR ECZEMA: ICD-10-CM

## 2020-01-17 LAB
ALBUMIN SERPL-MCNC: 4.3 G/DL (ref 3.5–5)
ALBUMIN/GLOB SERPL: 1.3 {RATIO} (ref 1.1–2.2)
ALP SERPL-CCNC: 57 U/L (ref 45–117)
ALT SERPL-CCNC: 16 U/L (ref 12–78)
ANION GAP SERPL CALC-SCNC: 4 MMOL/L (ref 5–15)
AST SERPL-CCNC: 14 U/L (ref 15–37)
BILIRUB SERPL-MCNC: 0.9 MG/DL (ref 0.2–1)
BUN SERPL-MCNC: 12 MG/DL (ref 6–20)
BUN/CREAT SERPL: 14 (ref 12–20)
CALCIUM SERPL-MCNC: 8.9 MG/DL (ref 8.5–10.1)
CHLORIDE SERPL-SCNC: 107 MMOL/L (ref 97–108)
CHOLEST SERPL-MCNC: 196 MG/DL
CO2 SERPL-SCNC: 27 MMOL/L (ref 21–32)
CREAT SERPL-MCNC: 0.87 MG/DL (ref 0.55–1.02)
ERYTHROCYTE [DISTWIDTH] IN BLOOD BY AUTOMATED COUNT: 12.3 % (ref 11.5–14.5)
GLOBULIN SER CALC-MCNC: 3.2 G/DL (ref 2–4)
GLUCOSE SERPL-MCNC: 84 MG/DL (ref 65–100)
HCT VFR BLD AUTO: 43.8 % (ref 35–47)
HDLC SERPL-MCNC: 63 MG/DL
HDLC SERPL: 3.1 {RATIO} (ref 0–5)
HGB BLD-MCNC: 14.4 G/DL (ref 11.5–16)
LDLC SERPL CALC-MCNC: 119 MG/DL (ref 0–100)
LIPID PROFILE,FLP: ABNORMAL
MCH RBC QN AUTO: 31.4 PG (ref 26–34)
MCHC RBC AUTO-ENTMCNC: 32.9 G/DL (ref 30–36.5)
MCV RBC AUTO: 95.4 FL (ref 80–99)
NRBC # BLD: 0 K/UL (ref 0–0.01)
NRBC BLD-RTO: 0 PER 100 WBC
PLATELET # BLD AUTO: 241 K/UL (ref 150–400)
PMV BLD AUTO: 10.1 FL (ref 8.9–12.9)
POTASSIUM SERPL-SCNC: 3.8 MMOL/L (ref 3.5–5.1)
PROT SERPL-MCNC: 7.5 G/DL (ref 6.4–8.2)
RBC # BLD AUTO: 4.59 M/UL (ref 3.8–5.2)
SODIUM SERPL-SCNC: 138 MMOL/L (ref 136–145)
T4 FREE SERPL-MCNC: 0.9 NG/DL (ref 0.8–1.5)
TRIGL SERPL-MCNC: 70 MG/DL (ref ?–150)
TSH SERPL DL<=0.05 MIU/L-ACNC: 8.06 UIU/ML (ref 0.36–3.74)
VLDLC SERPL CALC-MCNC: 14 MG/DL
WBC # BLD AUTO: 5.8 K/UL (ref 3.6–11)

## 2020-01-17 RX ORDER — ADAPALENE 45 G/G
GEL TOPICAL
COMMUNITY
End: 2020-11-05

## 2020-01-17 RX ORDER — DAPSONE 75 MG/G
GEL TOPICAL
COMMUNITY
End: 2020-11-05

## 2020-01-17 RX ORDER — CLINDAMYCIN AND BENZOYL PEROXIDE 1 %-5 %
KIT TOPICAL
COMMUNITY
End: 2020-11-05
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
## 2020-01-17 NOTE — PATIENT INSTRUCTIONS
1. Hyperlipidemia, unspecified hyperlipidemia type Reviewed last lipids, better with weight loss and controlling hypothyroid, not indicated to start statin meds, labs per orders - METABOLIC PANEL, COMPREHENSIVE; Future - LIPID PANEL; Future 2. Acquired hypothyroidism Off thyroid med about 3 wk, will check all labs, then likely restart as indicated 
- CBC W/O DIFF; Future - METABOLIC PANEL, COMPREHENSIVE; Future - LIPID PANEL; Future 
- TSH 3RD GENERATION; Future - T4, FREE; Future 3. At high risk for breast cancer Mom dx with Breast ca at age 43, will refer to breast center to establish, start screening this year, 10 years prior to age mom was at diagnosis, consider genetic screening as well, can discuss with breast center 
- Ale 44 4. Family hx of ischem heart dis and oth dis of the circ sys Will continue to monitor lipids closely, youngest uncle was in 45s at time of first cardiac event. Maintain healthy weight, do not smoke, eat healthy diet to continue to maintain good cardiovascular risk profile. BP well controlled 5. Traumatic brain injury with loss of consciousness, sequela ([facility] [address] Continues to follow with neuro for this 6. Fatigue, unspecified type May be related to uncontrolled thyroid vs sequela from TBI vs other, labs, follow up with neuro - CBC W/O DIFF; Future 
- TSH 3RD GENERATION; Future - T4, FREE; Future 7. Anxiety Sequela from TBI, mgmt right now per neuro 8. Depression, unspecified depression type As above 9. Papular eczema Recommend ceramide containing topical like eucerin eczema or cerave. Pt understanding, if active lesion return for re eval  
 
10. Establishing care with new doctor, encounter for As above 11. Well woman exam (no gynecological exam) Per protocol, anticipatory guidance discussed, follow up with GYN and follow with us annually or on an as needed basis, call with questions or concerns

## 2020-01-17 NOTE — LETTER
January 17, 2020 Vijay Zarco [address] [address] Dear Reva Friedman: Thank you for requesting access to [employer]. Please follow the instructions below to securely access and download your online medical record. [employer] allows you to send messages to your doctor, view your test results, renew your prescriptions, schedule appointments, and more. How Do I Sign Up? 1. In your internet browser, go to [email or web address]. 2. Click on the First Time User? Click Here link in the Sign In box. You will see the New Member Sign Up page. 3. Enter your [employer] Access Code exactly as it appears below. You will not need to use this code after youve completed the sign-up process. If you do not sign up before the expiration date, you must request a new code. [employer] Access Code: [email or web address] Expires: 3/2/2020  9:24 AM  
 
4. Enter the last four digits of your Social Security Number (xxxx) and Date of Birth (mm/dd/yyyy) as indicated and click Submit. You will be taken to the next sign-up page. 5. Create a [employer] ID. This will be your [employer] login ID and cannot be changed, so think of one that is secure and easy to remember. 6. Create a [employer] password. You can change your password at any time. 7. Enter your Password Reset Question and Answer. This can be used at a later time if you forget your password. 8. Enter your e-mail address. You will receive e-mail notification when new information is available in [address]. 9. Click Sign Up. You can now view and download portions of your medical record. 10. Click the Download Summary menu link to download a portable copy of your medical information. Additional Information If you have questions, please visit the Frequently Asked Questions section of the [employer] website at [email or web address] Remember, [employer] is NOT to be used for urgent needs. For medical emergencies, dial 911. Now available from your iPhone and Android! Sincerely, [employer]
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
## 2020-01-20 RX ORDER — LEVOTHYROXINE SODIUM 75 UG/1
75 TABLET ORAL
Qty: 90 TAB | Refills: 0 | Status: SHIPPED | OUTPATIENT
Start: 2020-01-20 | End: 2020-01-27 | Stop reason: SDUPTHER

## 2020-01-20 NOTE — PROGRESS NOTES
TSH elevated. Recommend restarting thyroid medication, first thing in the morning. I will send the levothyroxine to your pharmacy and we should recheck a TSH is about 6 weeks  Metabolic panel looks good, normal kidney and liver function  Cholesterol panel is OK--slight elevation from goal in the LDL (bad cholesterol) but total is OK, Good cholesterol (HDL) is high and Triglycerides are low.

## 2020-01-24 ENCOUNTER — OFFICE VISIT (OUTPATIENT)
Dept: SURGERY | Age: 30
End: 2020-01-24

## 2020-01-24 VITALS
WEIGHT: 112 LBS | DIASTOLIC BLOOD PRESSURE: 67 MMHG | BODY MASS INDEX: 19.84 KG/M2 | HEART RATE: 77 BPM | HEIGHT: 63 IN | SYSTOLIC BLOOD PRESSURE: 113 MMHG

## 2020-01-24 DIAGNOSIS — N60.12 FIBROCYSTIC BREAST CHANGES OF BOTH BREASTS: Primary | ICD-10-CM

## 2020-01-24 DIAGNOSIS — N60.11 FIBROCYSTIC BREAST CHANGES OF BOTH BREASTS: Primary | ICD-10-CM

## 2020-01-24 DIAGNOSIS — Z80.3 FAMILY HISTORY OF BREAST CANCER: ICD-10-CM

## 2020-01-24 RX ORDER — TEMAZEPAM 30 MG/1
CAPSULE ORAL
COMMUNITY
End: 2020-11-05

## 2020-01-24 NOTE — PROGRESS NOTES
HISTORY OF PRESENT ILLNESS  Vijay Hooper is a 30 y.o. female. HPI NEW patient referral for consultation by Dr. Rosa Elena Barron for family history of breast cancer. The patient would like to discuss what needs to be done at this point as far as imaging and follow up due to the history. She denies any palpable lumps,nipple inversion or discharge and has not had any breast imaging. Family history - mother breast cancer at age 43 and survived. She had ILC and had BILATERAL mastectomies. The mother is now 21 years cancer free. No previous breast imaging    OB History        0    Para   0    Term   0       0    AB   0    Living   0       SAB   0    TAB   0    Ectopic   0    Molar        Multiple   0    Live Births              Obstetric Comments   Menarche 13, LMP 1/3/2020, # of children 0, age of 1st delivery na, Hysterectomy/oophorectomy no/no, Breast bx none history of breast feeding na BCP yes, Hormone therapy none           Past Surgical History:   Procedure Laterality Date    HX WISDOM TEETH EXTRACTION       Review of Systems   Constitutional: Positive for malaise/fatigue. HENT: Negative. Eyes: Negative. Respiratory: Negative. Cardiovascular: Negative. Gastrointestinal: Negative. Genitourinary: Negative. Musculoskeletal: Positive for joint pain and neck pain. Skin: Negative. Neurological: Positive for headaches. Endo/Heme/Allergies: Negative. Psychiatric/Behavioral: Positive for depression and memory loss. The patient is nervous/anxious and has insomnia. Physical Exam  Constitutional:       Appearance: Normal appearance. Chest:      Breasts:         Right: No mass, nipple discharge, skin change or tenderness. Left: No mass, nipple discharge, skin change or tenderness. Musculoskeletal: Normal range of motion. Comments: UE x 2   Lymphadenopathy:      Upper Body:      Right upper body: No supraclavicular or axillary adenopathy.       Left upper body: No supraclavicular or axillary adenopathy. Neurological:      Mental Status: She is alert. Psychiatric:         Attention and Perception: Attention normal.         Mood and Affect: Mood normal.         Speech: Speech normal.         Behavior: Behavior normal.       Visit Vitals  /67   Pulse 77   Ht 5' 3\" (1.6 m)   Wt 112 lb (50.8 kg)   LMP 01/03/2020   BMI 19.84 kg/m²     ASSESSMENT and PLAN  Encounter Diagnoses   Name Primary?  Fibrocystic breast changes of both breasts Yes    Family history of breast cancer      Normal exam with no evidence of malignancy. Discussed being followed as a high risk patient. Would recommend BSmammogram 3D when she turns 26 (10 years before her mother's age of diagnosis). Discussed risk and benefits (information provided, mode of testing, use of contrast, higher rate of false positives and logistics of testing) of a screening breast MRI and she will have a baseline at age 26 as well. We will continue to discuss if she should have these yearly. Mother had genetic testing at the time of her diagnosis. BRCA1 and BRCA2 testing only. She will follow-up with her GYN to discuss the [facility] update panel. If her genetic testing is negative, no additional testing is needed for her daughter. If the mother's testing shows variations, we will discuss testing with the daughter. BSmammogram 3D and high risk screening breast MRI in 7/2020. RTC in 1 year or sooner PRN. She is comfortable with this plan. All questions answered and she stated understanding.

## 2020-01-24 NOTE — LETTER
1/24/2020 1:27 PM 
 
Patient:  Leland Nash YOB: 1990 Date of Visit: 1/24/2020 Dear Padmini Redding MD 
[address] [place] [address] [address] [place] [address] [id number] VIA In Basket 
 : Thank you for referring Ms. Leland Nash to me for evaluation/treatment. Below are the relevant portions of my assessment and plan of care. ASSESSMENT and PLAN Encounter Diagnoses Name Primary?  Fibrocystic breast changes of both breasts Yes  Family history of breast cancer Normal exam with no evidence of malignancy. Discussed being followed as a high risk patient. Would recommend BSmammogram 3D when she turns 26 (10 years before her mother's age of diagnosis). Discussed risk and benefits (information provided, mode of testing, use of contrast, higher rate of false positives and logistics of testing) of a screening breast MRI and she will have a baseline at age 26 as well. We will continue to discuss if she should have these yearly. Mother had genetic testing at the time of her diagnosis. BRCA1 and BRCA2 testing only. She will follow-up with her GYN to discuss the [facility] update panel. If her genetic testing is negative, no additional testing is needed for her daughter. If the mother's testing shows variations, we will discuss testing with the daughter. BSmammogram 3D and high risk screening breast MRI in 7/2020. RTC in 1 year or sooner PRN. She is comfortable with this plan. All questions answered and she stated understanding. If you have questions, please do not hesitate to call me. I look forward to following Ms. Estevez along with you. Sincerely, Mirian Darby NP

## 2020-01-27 RX ORDER — LEVOTHYROXINE SODIUM 75 UG/1
75 TABLET ORAL
Qty: 90 TAB | Refills: 0 | Status: SHIPPED | COMMUNITY
Start: 2020-01-27 | End: 2020-10-26

## 2020-01-27 NOTE — TELEPHONE ENCOUNTER
----- Message from Arie Molina sent at 1/24/2020  2:41 PM EST -----  Regarding: Prescription Question  Contact: [phone]  Please send thyroid medication prescription to [id number] for 90 day supply. Thank you.

## 2020-01-31 RX ORDER — NITROFURANTOIN 25; 75 MG/1; MG/1
100 CAPSULE ORAL
Qty: 30 CAP | Refills: 4 | Status: SHIPPED | OUTPATIENT
Start: 2020-01-31 | End: 2020-10-28 | Stop reason: SDUPTHER

## 2020-10-26 DIAGNOSIS — E03.9 ACQUIRED HYPOTHYROIDISM: Primary | ICD-10-CM

## 2020-10-26 RX ORDER — LEVOTHYROXINE SODIUM 75 UG/1
TABLET ORAL
Qty: 90 TAB | Refills: 0 | Status: SHIPPED | OUTPATIENT
Start: 2020-10-26 | End: 2021-01-18

## 2020-10-26 NOTE — TELEPHONE ENCOUNTER
Please call patient  Overdue for thyroid follow up lab  i've ordered it and she should have it so we can adjust thyroid medication appropriately Reji Priest is a 86 year old male presenting with Medicare wellness visit.  Medications reviewed and updated.  Denies known Latex allergy or symptoms of Latex sensitivity.  Social History     Tobacco Use   Smoking Status Never Smoker   Smokeless Tobacco Current User   • Types: Chew     Health Maintenance Due   Topic Date Due   • DM/CKD Microalbumin  12/07/1950   • Shingles Vaccine (1 of 2) 12/07/1980   • DTaP/Tdap/Td Vaccine (1 - Tdap) 03/30/2007   • Medicare Wellness 65+  08/15/2019   • Depression Screening  08/15/2019       Patient is due for the topics as listed above and wishes to proceed with them. Orders placed for Depression Screening , DM/CKD Microalbumin and MWV (Medicare Wellness Visit)

## 2020-10-27 ENCOUNTER — TELEPHONE (OUTPATIENT)
Dept: FAMILY MEDICINE CLINIC | Age: 30
End: 2020-10-27

## 2020-10-27 DIAGNOSIS — F41.9 ANXIETY: ICD-10-CM

## 2020-10-27 DIAGNOSIS — E03.9 ACQUIRED HYPOTHYROIDISM: ICD-10-CM

## 2020-10-27 DIAGNOSIS — F32.A DEPRESSION, UNSPECIFIED DEPRESSION TYPE: ICD-10-CM

## 2020-10-27 DIAGNOSIS — E78.5 HYPERLIPIDEMIA, UNSPECIFIED HYPERLIPIDEMIA TYPE: Primary | ICD-10-CM

## 2020-10-27 NOTE — TELEPHONE ENCOUNTER
Pt called office back and states understanding of having thyroid levels checked. Pt states she will also like to have her annual labs drawn as well, so they can be discussed at her well women appointment. Pt states she will go fasting.

## 2020-10-27 NOTE — TELEPHONE ENCOUNTER
Called pt, and left a voice message, advising she is due to have her lab values checked. Advised pt I will be mailed lab orders to her home, and to call office with questions or concerns.

## 2020-11-04 NOTE — PATIENT INSTRUCTIONS
Well Visit, Ages 25 to 48: Care Instructions  Your Care Instructions     Physical exams can help you stay healthy. Your doctor has checked your overall health and may have suggested ways to take good care of yourself. He or she also may have recommended tests. At home, you can help prevent illness with healthy eating, regular exercise, and other steps. Follow-up care is a key part of your treatment and safety. Be sure to make and go to all appointments, and call your doctor if you are having problems. It's also a good idea to know your test results and keep a list of the medicines you take. How can you care for yourself at home? · Reach and stay at a healthy weight. This will lower your risk for many problems, such as obesity, diabetes, heart disease, and high blood pressure. · Get at least 30 minutes of physical activity on most days of the week. Walking is a good choice. You also may want to do other activities, such as running, swimming, cycling, or playing tennis or team sports. Discuss any changes in your exercise program with your doctor. · Do not smoke or allow others to smoke around you. If you need help quitting, talk to your doctor about stop-smoking programs and medicines. These can increase your chances of quitting for good. · Talk to your doctor about whether you have any risk factors for sexually transmitted infections (STIs). Having one sex partner (who does not have STIs and does not have sex with anyone else) is a good way to avoid these infections. · Use birth control if you do not want to have children at this time. Talk with your doctor about the choices available and what might be best for you. · Protect your skin from too much sun. When you're outdoors from 10 a.m. to 4 p.m., stay in the shade or cover up with clothing and a hat with a wide brim. Wear sunglasses that block UV rays. Even when it's cloudy, put broad-spectrum sunscreen (SPF 30 or higher) on any exposed skin.   · See a dentist one or two times a year for checkups and to have your teeth cleaned. · Wear a seat belt in the car. Follow your doctor's advice about when to have certain tests. These tests can spot problems early. For everyone  · Cholesterol. Have the fat (cholesterol) in your blood tested after age 21. Your doctor will tell you how often to have this done based on your age, family history, or other things that can increase your risk for heart disease. · Blood pressure. Have your blood pressure checked during a routine doctor visit. Your doctor will tell you how often to check your blood pressure based on your age, your blood pressure results, and other factors. · Vision. Talk with your doctor about how often to have a glaucoma test.  · Diabetes. Ask your doctor whether you should have tests for diabetes. · Colon cancer. Your risk for colorectal cancer gets higher as you get older. Some experts say that adults should start regular screening at age 48 and stop at age 76. Others say to start before age 48 or continue after age 76. Talk with your doctor about your risk and when to start and stop screening. For women  · Breast exam and mammogram. Talk to your doctor about when you should have a clinical breast exam and a mammogram. Medical experts differ on whether and how often women under 50 should have these tests. Your doctor can help you decide what is right for you. · Cervical cancer screening test and pelvic exam. Begin with a Pap test at age 24. The test often is part of a pelvic exam. Starting at age 27, you may choose to have a Pap test, an HPV test, or both tests at the same time (called co-testing). Talk with your doctor about how often to have testing. · Tests for sexually transmitted infections (STIs). Ask whether you should have tests for STIs. You may be at risk if you have sex with more than one person, especially if your partners do not wear condoms.   For men  · Tests for sexually transmitted infections (STIs). Ask whether you should have tests for STIs. You may be at risk if you have sex with more than one person, especially if you do not wear a condom. · Testicular cancer exam. Ask your doctor whether you should check your testicles regularly. · Prostate exam. Talk to your doctor about whether you should have a blood test (called a PSA test) for prostate cancer. Experts differ on whether and when men should have this test. Some experts suggest it if you are older than 39 and are -American or have a father or brother who got prostate cancer when he was younger than 72. When should you call for help? Watch closely for changes in your health, and be sure to contact your doctor if you have any problems or symptoms that concern you. Where can you learn more? Go to http://www.amaro.com/  Enter P072 in the search box to learn more about \"Well Visit, Ages 25 to 48: Care Instructions. \"  Current as of: May 27, 2020               Content Version: 12.6  © 2006-2020 [employer]. Care instructions adapted under license by [employer] (which disclaims liability or warranty for this information). If you have questions about a medical condition or this instruction, always ask your healthcare professional. [place] [address] any warranty or liability for your use of this information.
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
## 2020-11-05 ENCOUNTER — OFFICE VISIT (OUTPATIENT)
Dept: OBGYN CLINIC | Age: 30
End: 2020-11-05
Payer: COMMERCIAL

## 2020-11-05 VITALS — WEIGHT: 120 LBS | BODY MASS INDEX: 21.26 KG/M2 | DIASTOLIC BLOOD PRESSURE: 84 MMHG | SYSTOLIC BLOOD PRESSURE: 123 MMHG

## 2020-11-05 DIAGNOSIS — T83.32XA INTRAUTERINE CONTRACEPTIVE DEVICE THREADS LOST, INITIAL ENCOUNTER: ICD-10-CM

## 2020-11-05 DIAGNOSIS — Z01.419 ENCOUNTER FOR GYNECOLOGICAL EXAMINATION: Primary | ICD-10-CM

## 2020-11-05 DIAGNOSIS — F41.9 ANXIETY: ICD-10-CM

## 2020-11-05 DIAGNOSIS — E03.9 ACQUIRED HYPOTHYROIDISM: ICD-10-CM

## 2020-11-05 DIAGNOSIS — E78.5 HYPERLIPIDEMIA, UNSPECIFIED HYPERLIPIDEMIA TYPE: ICD-10-CM

## 2020-11-05 DIAGNOSIS — F32.A DEPRESSION, UNSPECIFIED DEPRESSION TYPE: ICD-10-CM

## 2020-11-05 DIAGNOSIS — Z30.431 SURVEILLANCE OF (INTRAUTERINE) CONTRACEPTIVE DEVICE: ICD-10-CM

## 2020-11-05 LAB
ALBUMIN SERPL-MCNC: 4.2 G/DL (ref 3.5–5)
ALBUMIN/GLOB SERPL: 1.4 {RATIO} (ref 1.1–2.2)
ALP SERPL-CCNC: 57 U/L (ref 45–117)
ALT SERPL-CCNC: 22 U/L (ref 12–78)
ANION GAP SERPL CALC-SCNC: 7 MMOL/L (ref 5–15)
AST SERPL-CCNC: 23 U/L (ref 15–37)
BILIRUB SERPL-MCNC: 0.9 MG/DL (ref 0.2–1)
BUN SERPL-MCNC: 8 MG/DL (ref 6–20)
BUN/CREAT SERPL: 10 (ref 12–20)
CALCIUM SERPL-MCNC: 9.3 MG/DL (ref 8.5–10.1)
CHLORIDE SERPL-SCNC: 108 MMOL/L (ref 97–108)
CHOLEST SERPL-MCNC: 180 MG/DL
CO2 SERPL-SCNC: 26 MMOL/L (ref 21–32)
CREAT SERPL-MCNC: 0.82 MG/DL (ref 0.55–1.02)
ERYTHROCYTE [DISTWIDTH] IN BLOOD BY AUTOMATED COUNT: 12.5 % (ref 11.5–14.5)
GLOBULIN SER CALC-MCNC: 2.9 G/DL (ref 2–4)
GLUCOSE SERPL-MCNC: 97 MG/DL (ref 65–100)
HCT VFR BLD AUTO: 43 % (ref 35–47)
HDLC SERPL-MCNC: 59 MG/DL
HDLC SERPL: 3.1 {RATIO} (ref 0–5)
HGB BLD-MCNC: 14.2 G/DL (ref 11.5–16)
LDLC SERPL CALC-MCNC: 106 MG/DL (ref 0–100)
LIPID PROFILE,FLP: ABNORMAL
MCH RBC QN AUTO: 31.4 PG (ref 26–34)
MCHC RBC AUTO-ENTMCNC: 33 G/DL (ref 30–36.5)
MCV RBC AUTO: 95.1 FL (ref 80–99)
NRBC # BLD: 0 K/UL (ref 0–0.01)
NRBC BLD-RTO: 0 PER 100 WBC
PLATELET # BLD AUTO: 236 K/UL (ref 150–400)
PMV BLD AUTO: 10.3 FL (ref 8.9–12.9)
POTASSIUM SERPL-SCNC: 4.2 MMOL/L (ref 3.5–5.1)
PROT SERPL-MCNC: 7.1 G/DL (ref 6.4–8.2)
RBC # BLD AUTO: 4.52 M/UL (ref 3.8–5.2)
SODIUM SERPL-SCNC: 141 MMOL/L (ref 136–145)
TRIGL SERPL-MCNC: 75 MG/DL (ref ?–150)
TSH SERPL DL<=0.05 MIU/L-ACNC: 3.14 UIU/ML (ref 0.36–3.74)
VLDLC SERPL CALC-MCNC: 15 MG/DL
WBC # BLD AUTO: 5.1 K/UL (ref 3.6–11)

## 2020-11-05 PROCEDURE — 99395 PREV VISIT EST AGE 18-39: CPT | Performed by: OBSTETRICS & GYNECOLOGY

## 2020-11-05 RX ORDER — NITROFURANTOIN 25; 75 MG/1; MG/1
100 CAPSULE ORAL
Qty: 30 CAP | Refills: 5 | Status: SHIPPED | OUTPATIENT
Start: 2020-11-05 | End: 2020-11-05 | Stop reason: CLARIF

## 2020-11-05 RX ORDER — NITROFURANTOIN 25; 75 MG/1; MG/1
100 CAPSULE ORAL
Qty: 30 CAP | Refills: 5 | Status: SHIPPED | OUTPATIENT
Start: 2020-11-05 | End: 2021-11-22
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
## 2020-11-05 NOTE — PROGRESS NOTES
Great work, improvement in cholesterol from last check, LDL is still borderline but it is better from the last check  Metabolic panel including liver and kidney function is normal  Blood counts are normal  TSH is now normal

## 2020-11-23 NOTE — PROGRESS NOTES
This is a follow-up visit for Cheng Lisette is a [de-identified] ,  26 y.o. female [facility] whose No LMP recorded. (Menstrual status: IUD). was on . She had an Mirena IUD placed 3 years ago, 9/2017. At her well woman exam earlier this month, strings were not visualized. Denies any pain or bleeding. [facility] Ultrasound was done today and revealed appropriate placement of the IUD in the endometrial cavity. TRANSVAGINAL ULTRASOUND PERFORMED  UTERUS IS ANTEVERTED, NORMAL IN SIZE AND ECHOGENICITY. ENDOMETRIUM MEASURES 1-2MM IN THICKNESS. NO EVIDENCE OF MASSES OR ABNORMALITIES ARE SEEN. IUD IS SEEN IN THE PROPER POSITION WITHIN THE ENDOMETRIAL CAVITY IN THE UTERINE FUNDUS. RIGHT OVARY APPEARS WITHIN NORMAL LIMITS. LEFT OVARY APPEARS WITHIN NORMAL LIMITS. NO FREE FLUID SEEN IN THE CDS. Past Medical History:   Diagnosis Date    Acne     Anxiety     Arm pain     Back pain     Confusion     Depression     Gynecologic exam normal 2017    Dr. Billie Bernal     Headache     Hypothyroid     Insomnia     takes restoril and trazadone qhs    [facility] ([facility]     Memory loss     Neck pain     OAB (overactive bladder)     Resolved     Recurrent UTI April and July 2012    Has symtoms of urgency and frequency in between    Routine Papanicolaou smear 06/07/2019    Negative ; HPV Negative      Past Surgical History:   Procedure Laterality Date    HX WISDOM TEETH EXTRACTION  2008     Social History     Occupational History    Occupation: Unemployed     Employer: OTHER     Comment: Recently graduated   Tobacco Use    Smoking status: Never Smoker    Smokeless tobacco: Never Used    Tobacco comment: Never used vapor or e-cigs    Substance and Sexual Activity    Alcohol use:  Yes     Alcohol/week: 1.7 standard drinks     Types: 1 Glasses of wine, 1 Standard drinks or equivalent per week     Comment: wine/week    Drug use: No    Sexual activity: Yes     Partners: Male     Birth control/protection: I.U.D. Comment: Priyanka Jackson     Family History   Problem Relation Age of Onset    Breast Cancer Mother 43    Heart Disease Father     Alcohol abuse Maternal Uncle     Diabetes Maternal Grandmother     Heart Disease Maternal Grandmother     Thyroid Disease Maternal Grandmother     Alcohol abuse Maternal Grandmother     Cancer Maternal Grandfather     Alcohol abuse Maternal Grandfather     Liver Disease Maternal Grandfather     Other Sister         GYN problems    MS Paternal Uncle        Allergies   Allergen Reactions    Levoxyl [Levothyroxine] Swelling     Reports facial swelling     Tylenol [Acetaminophen] Other (comments)     Night terrors     Prior to Admission medications    Medication Sig Start Date End Date Taking? Authorizing Provider   nitrofurantoin, macrocrystal-monohydrate, (Macrobid) 100 mg capsule Take 1 Cap by mouth nightly as needed (postcoital prophylaxis). 11/5/20  Yes Ivory Grayson MD   Unithroid 75 mcg tablet TAKE 1 TABLET DAILY BEFORE BREAKFAST 10/26/20  Yes Elio Arreaga MD   multivitamin (ONE A DAY) tablet Take 1 Tab by mouth daily. Yes Provider, Historical   levonorgestrel (KYLEENA) 17.5 mcg/24 hr (5 years) IUD 1 Device by IntraUTERine route daily. Indications: Office supplied 7/20/17  Yes Ivory Grayson MD   cranberry extract 450 mg Tab Take  by mouth. Yes Provider, Historical   lactobacillus rhamnosus gg 10 billion cell (PROBIOTIC) 10 billion cell capsule Take 1 Cap by mouth daily.  10/2/12  Yes Los Rodriguez MD        Review of Systems: History obtained from the patient  Constitutional: negative for weight loss, fever, night sweats  Breast: negative for breast lumps, nipple discharge, galactorrhea  GI: negative for change in bowel habits, abdominal pain, black or bloody stools  : negative for frequency, dysuria, hematuria, vaginal discharge  MSK: negative for back pain, joint pain, muscle pain  Skin: negative for itching, rash, hives  Psych: negative for anxiety, depression, change in mood      Objective:  Visit Vitals  /60   Wt 121 lb (54.9 kg)   BMI 21.43 kg/m²       Physical Exam:   PHYSICAL EXAMINATION    Constitutional  · Appearance: well-nourished, well developed, alert, in no acute distress      Skin  · General Inspection: no rash, no lesions identified    Neurologic/Psychiatric  · Mental Status:  · Orientation: grossly oriented to person, place and time  · Mood and Affect: mood normal, affect appropriate    Assessment:   IUD strings not seen on recent exam  US today shows IUD in proper position    Plan:   RTO 1yr for AE as scheduled, sooner prn

## 2020-11-24 ENCOUNTER — OFFICE VISIT (OUTPATIENT)
Dept: OBGYN CLINIC | Age: 30
End: 2020-11-24
Payer: COMMERCIAL

## 2020-11-24 VITALS — DIASTOLIC BLOOD PRESSURE: 60 MMHG | BODY MASS INDEX: 21.43 KG/M2 | WEIGHT: 121 LBS | SYSTOLIC BLOOD PRESSURE: 115 MMHG

## 2020-11-24 DIAGNOSIS — T83.32XD INTRAUTERINE CONTRACEPTIVE DEVICE THREADS LOST, SUBSEQUENT ENCOUNTER: Primary | ICD-10-CM

## 2020-11-24 PROCEDURE — 76830 TRANSVAGINAL US NON-OB: CPT | Performed by: OBSTETRICS & GYNECOLOGY

## 2020-11-24 PROCEDURE — 99212 OFFICE O/P EST SF 10 MIN: CPT | Performed by: OBSTETRICS & GYNECOLOGY

## 2021-01-18 RX ORDER — LEVOTHYROXINE SODIUM 75 UG/1
TABLET ORAL
Qty: 90 TAB | Refills: 0 | Status: SHIPPED | OUTPATIENT
Start: 2021-01-18 | End: 2021-04-12

## 2021-01-19 ENCOUNTER — OFFICE VISIT (OUTPATIENT)
Dept: FAMILY MEDICINE CLINIC | Age: 31
End: 2021-01-19
Payer: COMMERCIAL

## 2021-01-19 VITALS
RESPIRATION RATE: 18 BRPM | BODY MASS INDEX: 21.62 KG/M2 | HEART RATE: 76 BPM | OXYGEN SATURATION: 98 % | SYSTOLIC BLOOD PRESSURE: 111 MMHG | DIASTOLIC BLOOD PRESSURE: 65 MMHG | WEIGHT: 122 LBS | HEIGHT: 63 IN | TEMPERATURE: 97 F

## 2021-01-19 DIAGNOSIS — Z91.89 AT HIGH RISK FOR BREAST CANCER: ICD-10-CM

## 2021-01-19 DIAGNOSIS — E03.9 ACQUIRED HYPOTHYROIDISM: ICD-10-CM

## 2021-01-19 DIAGNOSIS — E78.5 HYPERLIPIDEMIA, UNSPECIFIED HYPERLIPIDEMIA TYPE: ICD-10-CM

## 2021-01-19 DIAGNOSIS — Z13.1 SCREENING FOR DIABETES MELLITUS: ICD-10-CM

## 2021-01-19 DIAGNOSIS — Z12.31 ENCOUNTER FOR SCREENING MAMMOGRAM FOR BREAST CANCER: ICD-10-CM

## 2021-01-19 DIAGNOSIS — Z00.00 WELL WOMAN EXAM (NO GYNECOLOGICAL EXAM): Primary | ICD-10-CM

## 2021-01-19 DIAGNOSIS — Z13.220 SCREENING FOR LIPID DISORDERS: ICD-10-CM

## 2021-01-19 DIAGNOSIS — Z80.3 FAMILY HISTORY OF BREAST CANCER IN MOTHER: ICD-10-CM

## 2021-01-19 PROCEDURE — 99395 PREV VISIT EST AGE 18-39: CPT | Performed by: FAMILY MEDICINE

## 2021-01-19 PROCEDURE — 99213 OFFICE O/P EST LOW 20 MIN: CPT | Performed by: FAMILY MEDICINE

## 2021-01-19 RX ORDER — UREA 10 %
100 LOTION (ML) TOPICAL DAILY
COMMUNITY

## 2021-01-19 RX ORDER — ASCORBIC ACID 250 MG
250 TABLET ORAL
COMMUNITY
End: 2022-03-07

## 2021-01-19 NOTE — LETTER
1/19/2021 10:44 AM 
 
Ms. Kayla Tejeda [place] [id number] To Whom It May Concern: 
 
Kayla Tejeda is a patient under my medical care. She is currently healthy and does eat a specific diet. Please allow her access to [address] and her supplemental vitamins to maintain her health while she is in residence.  
 
Sincerely, 
 
 
Kingsley Essex, MD

## 2021-01-19 NOTE — PROGRESS NOTES
Subjective:   26 y.o. female for Well Woman Check. Her gyne and breast care is done elsewhere by her Ob-Gyne physician. Past Medical History:   Diagnosis Date    Acne     Anxiety     Arm pain     Back pain     Confusion     Depression     Gynecologic exam normal 2017    Dr. Drinda Romberg     Headache     Hypothyroid     Insomnia     takes restoril and trazadone qhs    IUD (int[facility]ine [facility]) [facility]14/[facility]     Memory loss     Neck pain     OAB (overactive bladder)     Resolved     Recurrent UTI April and July 2012    Has symtoms of urgency and frequency in between    Routine Papanicolaou smear 06/07/2019    Negative ; HPV Negative      Past Surgical History:   Procedure Laterality Date    HX WISDOM TEETH EXTRACTION  2008     Family History   Problem Relation Age of Onset    Breast Cancer Mother 43    Heart Disease Father     Alcohol abuse Maternal Uncle     Diabetes Maternal Grandmother     Heart Disease Maternal Grandmother     Thyroid Disease Maternal Grandmother     Alcohol abuse Maternal Grandmother     Cancer Maternal Grandfather     Alcohol abuse Maternal Grandfather     Liver Disease Maternal Grandfather     Other Sister         GYN problems    MS Paternal Uncle      Social History     Tobacco Use    Smoking status: Never Smoker    Smokeless tobacco: Never Used    Tobacco comment: Never used vapor or e-cigs    Substance Use Topics    Alcohol use:  Yes     Alcohol/week: 1.7 standard drinks     Types: 1 Glasses of wine, 1 Standard drinks or equivalent per week     Comment: wine/week      Tobacco:no  Etoh: 2 glasses of wine a week  Illicit: no    SA: 1 male partner, Nasir Munguia in place    Br Ca hx: mom, dx age 43, pt is now 26, has not started screening, doesn't want to do MRI yet, did have mom update gneetic testing, was all negative    Exercise: running, weights, [employer] next week, doing a lot of PT      Thyroid--feeling stable  Her iud will be expiring 2022 and she may consider growing her family at that time, if she is desiring growing family, will want to lower TSH to 1.5 or below as a goal    ROS: Feeling generally well. No TIA's or unusual headaches, no dysphagia. No prolonged cough. No dyspnea or chest pain on exertion. No abdominal pain, change in bowel habits, black or bloody stools. No urinary tract symptoms. No new or unusual musculoskeletal symptoms. Objective: The patient appears well, alert, oriented x 3, in no distress. Visit Vitals  /65   Pulse 76   Temp 97 °F (36.1 °C) (Temporal)   Resp 18   Ht 5' 3\" (1.6 m)   Wt 122 lb (55.3 kg)   SpO2 98%   BMI 21.61 kg/m²     ENT normal.  Neck supple. No adenopathy or thyromegaly. BILL. Lungs are clear, good air entry, no wheezes, rhonchi or rales. S1 and S2 normal, no murmurs, regular rate and rhythm. Abdomen soft without tenderness, guarding, mass or organomegaly. Extremities show no edema, normal peripheral pulses. Neurological is normal, no focal findings. Breast and Pelvic exams are deferred. Assessment/Plan:   Well Woman  continue present diet with no restrictions, continue present plan, routine labs ordered, call if any problems, work note given so patient may have her nutritional supplements at the     [id number] ICD-9-CM    1. Well woman exam (no gynecological exam)  Z00.00 V70.0 CBC W/O DIFF      METABOLIC PANEL, COMPREHENSIVE      LIPID PANEL    [V70.0]   2. Family history of breast cancer in mother  Z80.2 V17.4 SHELIA 3D JANA W MAMMO BI SCREENING INCL CAD   3. At high risk for breast cancer  Z91.89 V49.89 SHELIA 3D JANA W MAMMO BI SCREENING INCL CAD   4. Encounter for screening mammogram for breast cancer  Z12.31 V76.12 SHELIA 3D JANA W MAMMO BI SCREENING INCL CAD   5.  Screening for lipid disorders  Z13.220 V77.91 CBC W/O DIFF      METABOLIC PANEL, COMPREHENSIVE      LIPID PANEL   6. Screening for diabetes mellitus  Z13.1 V77.1 CBC W/O DIFF      METABOLIC PANEL, COMPREHENSIVE      LIPID PANEL   7. Acquired hypothyroidism  E03.9 244.9    8. Hyperlipidemia, unspecified hyperlipidemia type  E78.5 272.4    given fhx of breast ca, recommend mammo as ordered, she saw breast center and has opted not to get MRI she's decided, in part because mother's genetic testing was negative, we did discuss this in detail  We reviewed the breast center's note and risk factors  Lipids reviewed and updated  Thyroid reviewed and updated, discussed that if/when she decides she is ready for growing her family and is considering ttc, she will reach out, we will further suppress her TSH towards <1.5 for safety in pregnancy. At this time she is asymptomatic and we will not make any changes    Pt agreeable  Work note provided    Call with concerns  Good luck at the academy!

## 2021-04-12 RX ORDER — LEVOTHYROXINE SODIUM 75 UG/1
TABLET ORAL
Qty: 90 TAB | Refills: 0 | Status: SHIPPED | OUTPATIENT
Start: 2021-04-12 | End: 2021-07-16

## 2021-07-16 RX ORDER — LEVOTHYROXINE SODIUM 75 UG/1
TABLET ORAL
Qty: 90 TABLET | Refills: 0 | Status: SHIPPED | OUTPATIENT
Start: 2021-07-16 | End: 2021-10-14

## 2021-10-14 RX ORDER — LEVOTHYROXINE SODIUM 75 UG/1
TABLET ORAL
Qty: 90 TABLET | Refills: 0 | Status: SHIPPED | OUTPATIENT
Start: 2021-10-14 | End: 2022-01-12
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
## 2021-11-18 ENCOUNTER — HOSPITAL ENCOUNTER (OUTPATIENT)
Dept: LAB | Age: 31
Discharge: HOME | End: 2021-11-18
Payer: COMMERCIAL

## 2021-11-18 ENCOUNTER — OFFICE VISIT (OUTPATIENT)
Dept: OBGYN CLINIC | Age: 31
End: 2021-11-18
Payer: COMMERCIAL

## 2021-11-18 VITALS
HEART RATE: 87 BPM | HEIGHT: 63 IN | DIASTOLIC BLOOD PRESSURE: 75 MMHG | BODY MASS INDEX: 20.38 KG/M2 | WEIGHT: 115 LBS | SYSTOLIC BLOOD PRESSURE: 115 MMHG

## 2021-11-18 DIAGNOSIS — T83.32XD INTRAUTERINE CONTRACEPTIVE DEVICE THREADS LOST, SUBSEQUENT ENCOUNTER: ICD-10-CM

## 2021-11-18 DIAGNOSIS — Z01.419 ENCOUNTER FOR WELL WOMAN EXAM WITH ROUTINE GYNECOLOGICAL EXAM: Primary | ICD-10-CM

## 2021-11-18 DIAGNOSIS — Z12.4 PAP SMEAR FOR CERVICAL CANCER SCREENING: ICD-10-CM

## 2021-11-18 PROCEDURE — 88175 CYTOPATH C/V AUTO FLUID REDO: CPT

## 2021-11-18 PROCEDURE — 99395 PREV VISIT EST AGE 18-39: CPT | Performed by: OBSTETRICS & GYNECOLOGY

## 2021-11-18 PROCEDURE — 87624 HPV HI-RISK TYP POOLED RSLT: CPT

## 2021-11-18 RX ORDER — NORETHINDRONE ACETATE AND ETHINYL ESTRADIOL 1; .02 MG/1; MG/1
1 TABLET ORAL DAILY
Qty: 3 DOSE PACK | Refills: 0 | Status: SHIPPED | OUTPATIENT
Start: 2021-11-18 | End: 2022-03-07

## 2021-11-22 RX ORDER — NITROFURANTOIN 25; 75 MG/1; MG/1
CAPSULE ORAL
Qty: 30 CAPSULE | Refills: 5 | Status: SHIPPED | OUTPATIENT
Start: 2021-11-22

## 2022-01-12 RX ORDER — LEVOTHYROXINE SODIUM 75 UG/1
TABLET ORAL
Qty: 90 TABLET | Refills: 0 | Status: SHIPPED | OUTPATIENT
Start: 2022-01-12 | End: 2022-03-07 | Stop reason: SDUPTHER

## 2022-01-12 NOTE — TELEPHONE ENCOUNTER
Hasn't been seen in a year  Needs appt and labs if we are going to be able to safely continue to prescribe

## 2022-01-13 NOTE — TELEPHONE ENCOUNTER
Called pt, and left a  Voice message, asking that she call the office back and schedule an in office appointment with  in regards to her medications.

## 2022-02-28 ENCOUNTER — TELEPHONE (OUTPATIENT)
Dept: OBGYN CLINIC | Age: 32
End: 2022-02-28

## 2022-02-28 NOTE — TELEPHONE ENCOUNTER
Patient is calling asking to speak directly to Moira Park in regards to their mychart conversation. She is free until 10 and then sometime after 12.

## 2022-02-28 NOTE — TELEPHONE ENCOUNTER
Spoke with Erich Mars on the phone and she explained the only reason she asked is because she does not tolerate blood draws and she passes out. She is begging that they be done together.

## 2022-03-01 ENCOUNTER — TELEPHONE (OUTPATIENT)
Dept: FAMILY MEDICINE CLINIC | Age: 32
End: 2022-03-01

## 2022-03-01 DIAGNOSIS — E78.5 HYPERLIPIDEMIA, UNSPECIFIED HYPERLIPIDEMIA TYPE: ICD-10-CM

## 2022-03-01 DIAGNOSIS — Z13.1 SCREENING FOR DIABETES MELLITUS: ICD-10-CM

## 2022-03-01 DIAGNOSIS — E03.9 ACQUIRED HYPOTHYROIDISM: Primary | ICD-10-CM

## 2022-03-01 NOTE — TELEPHONE ENCOUNTER
----- Message from Ramila Lopez sent at 2/28/2022  4:43 PM EST -----  Subject: Appointment Request    Reason for Call: Routine Physical Exam    QUESTIONS  Type of Appointment? Established Patient  Reason for appointment request? No appointments available during search  Additional Information for Provider? pt needs appt for thyroid check and   physical. please call pt to schedule.   ---------------------------------------------------------------------------  --------------  CALL BACK INFO  What is the best way for the office to contact you? OK to leave message on   voicemail  Preferred Call Back Phone Number? [phone]  ---------------------------------------------------------------------------  --------------  SCRIPT ANSWERS  Relationship to Patient? Self  (If the patient has Medicare as their primary insurance coverage ask this   question) Are you requesting a Medicare Annual Wellness Visit? No  (Is the patient requesting a pap smear with their physical exam?)? No  (Is the patient requesting their annual physical and does not need PAP or   AWV per above?)? Yes   Have you been diagnosed with, awaiting test results for, or told that you   are suspected of having COVID-19 (Coronavirus)? (If patient has tested   negative or was tested as a requirement for work, school, or travel and   not based on symptoms, answer no)? No  Within the past 10 days have you developed any of the following symptoms   (answer no if symptoms have been present longer than 10 days or began   more than 10 days ago)? Fever or Chills, Cough, Shortness of breath or   difficulty breathing, Loss of taste or smell, Sore throat, Nasal   congestion, Sneezing or runny nose, Fatigue or generalized body aches   (answer no if pain is specific to a body part e.g. back pain), Diarrhea,   Headache? No  Have you had close contact with someone with COVID-19 in the last 7 days?    No  (Service Expert  click yes below to proceed with Rachell Mark As Usual   Scheduling)?  Yes

## 2022-03-01 NOTE — TELEPHONE ENCOUNTER
Pt declined first available appointment for physical with Dr. Nikunj Palomares (6/3/22 at 9 am) stating she does not want to wait 90 days for a physical, needs thyroid labs checked,  is trying to start a family, and wanted this all addressed with physical.    Pt declined first available virtual slot for tomorrow and scheduled in person routine follow up 3/8/22 at 9 am to discuss thyroid labs needed and family planning and will contact her insurance to see if other labs she's requesting are covered prior to physical. Diane Regalado

## 2022-03-04 DIAGNOSIS — Z31.49 INVESTIGATION AND TESTING FOR PROCREATION MANAGEMENT: Primary | ICD-10-CM

## 2022-03-04 NOTE — TELEPHONE ENCOUNTER
Rachna called from [facility] regarding lab orders pended for pt, waiting for orders to be signed. Pt at lab and is fasting.

## 2022-03-04 NOTE — TELEPHONE ENCOUNTER
Looks like [facility] scheduled pt for lab review  We order labs at time of visit as part of office policy  Pt to keep appt, have visit then I will determine what need be ordered and we'll draw

## 2022-03-04 NOTE — TELEPHONE ENCOUNTER
Pt called office. She is scheduled for lab review on Monday, 03/07/2022, and is at [facility] lab now fasting for lab draw.

## 2022-03-05 LAB
ALBUMIN SERPL-MCNC: 4.2 G/DL (ref 3.5–5)
ALBUMIN/GLOB SERPL: 1.4 {RATIO} (ref 1.1–2.2)
ALP SERPL-CCNC: 63 U/L (ref 45–117)
ALT SERPL-CCNC: 14 U/L (ref 12–78)
ANION GAP SERPL CALC-SCNC: 5 MMOL/L (ref 5–15)
AST SERPL-CCNC: 12 U/L (ref 15–37)
BILIRUB SERPL-MCNC: 0.7 MG/DL (ref 0.2–1)
BUN SERPL-MCNC: 8 MG/DL (ref 6–20)
BUN/CREAT SERPL: 10 (ref 12–20)
CALCIUM SERPL-MCNC: 9.3 MG/DL (ref 8.5–10.1)
CHLORIDE SERPL-SCNC: 106 MMOL/L (ref 97–108)
CHOLEST SERPL-MCNC: 172 MG/DL
CO2 SERPL-SCNC: 27 MMOL/L (ref 21–32)
CREAT SERPL-MCNC: 0.81 MG/DL (ref 0.55–1.02)
ERYTHROCYTE [DISTWIDTH] IN BLOOD BY AUTOMATED COUNT: 12.2 % (ref 11.5–14.5)
GLOBULIN SER CALC-MCNC: 3 G/DL (ref 2–4)
GLUCOSE SERPL-MCNC: 87 MG/DL (ref 65–100)
HCT VFR BLD AUTO: 45.9 % (ref 35–47)
HDLC SERPL-MCNC: 56 MG/DL
HDLC SERPL: 3.1 {RATIO} (ref 0–5)
HGB BLD-MCNC: 14.7 G/DL (ref 11.5–16)
LDLC SERPL CALC-MCNC: 100.2 MG/DL (ref 0–100)
MCH RBC QN AUTO: 31.1 PG (ref 26–34)
MCHC RBC AUTO-ENTMCNC: 32 G/DL (ref 30–36.5)
MCV RBC AUTO: 97.2 FL (ref 80–99)
NRBC # BLD: 0 K/UL (ref 0–0.01)
NRBC BLD-RTO: 0 PER 100 WBC
PLATELET # BLD AUTO: 272 K/UL (ref 150–400)
PMV BLD AUTO: 10.6 FL (ref 8.9–12.9)
POTASSIUM SERPL-SCNC: 4.3 MMOL/L (ref 3.5–5.1)
PROT SERPL-MCNC: 7.2 G/DL (ref 6.4–8.2)
RBC # BLD AUTO: 4.72 M/UL (ref 3.8–5.2)
SODIUM SERPL-SCNC: 138 MMOL/L (ref 136–145)
TRIGL SERPL-MCNC: 79 MG/DL (ref ?–150)
TSH SERPL DL<=0.05 MIU/L-ACNC: 0.01 UIU/ML (ref 0.36–3.74)
VLDLC SERPL CALC-MCNC: 15.8 MG/DL
WBC # BLD AUTO: 12.3 K/UL (ref 3.6–11)

## 2022-03-07 ENCOUNTER — VIRTUAL VISIT (OUTPATIENT)
Dept: FAMILY MEDICINE CLINIC | Age: 32
End: 2022-03-07
Payer: COMMERCIAL

## 2022-03-07 DIAGNOSIS — E03.9 ACQUIRED HYPOTHYROIDISM: Primary | ICD-10-CM

## 2022-03-07 DIAGNOSIS — E78.5 HYPERLIPIDEMIA, UNSPECIFIED HYPERLIPIDEMIA TYPE: ICD-10-CM

## 2022-03-07 PROCEDURE — 99214 OFFICE O/P EST MOD 30 MIN: CPT | Performed by: FAMILY MEDICINE

## 2022-03-07 RX ORDER — LEVOTHYROXINE SODIUM 100 UG/1
TABLET ORAL
Qty: 90 TABLET | Refills: 0 | Status: SHIPPED | OUTPATIENT
Start: 2022-03-07

## 2022-03-07 NOTE — PROGRESS NOTES
Chief Complaint   Patient presents with    Results     discuss recent lab results      Pt refused to answer COVID vaccine questions.

## 2022-03-07 NOTE — PROGRESS NOTES
Jamaica King is a 33 y.o. female who was seen by synchronous (real-time) audio-video technology on 3/7/2022. Consent: Jamaica King, who was seen by synchronous (real-time) audio-video technology, and/or her healthcare decision maker, is aware that this patient-initiated, Telehealth encounter on 3/7/2022 is a billable service, with coverage as determined by her insurance carrier. She is aware that she may receive a bill and has provided verbal consent to proceed: Yes. Assessment & Plan:   1. Acquired hypothyroidism  Dose change, has been takng 1.5 unithyroid tabs daily x 1.5 m or so  This is an effective dose of about 112  Recommend increase dose to 100 mcg / day and recheck 1 m  Goal going to 1st trimester would be about 0.1 to 0.5  Reviewed risk of over-suppression    - TSH 3RD GENERATION; Future    2. Hyperlipidemia, unspecified hyperlipidemia type  Reviewed labs, improved over last few years  Good work! Will not check during a pregnancy or lactation      F/u prn           Pt was counseled on risks, benefits and alternatives of treatment options. All questions were asked and answered and the patient was agreeable with the treatment plan as outlined. Subjective:   Jamaica King is a 33 y.o. female who was seen for Results (discuss recent lab results )      Pt is intending to try to conceive  Pt tells me she was taking 76 unithyroid and then independently she went up to 1.5 tab / day  No hyperthyroid symptoms  tsh slightly over-suppressed      Having some mild allergies    Started a prenatal vitamin, it does contain folic acid  It does contain iron      She is a       Medications, allergies, PMH, PSH, SOCH, [address] [place] [address] reviewed and updated per routine protocol, see chart for review and changes if not noted here. ROS  A 12 point review of systems was negative except as noted here or in the HPI.     Objective:   Vital Signs: (As obtained by patient/caregiver at home)  Patient-Reported Vitals 3/7/2022   Patient-Reported Weight 115lbs        [INSTRUCTIONS:  \"[x]\" Indicates a positive item  \"[]\" Indicates a negative item  -- DELETE ALL ITEMS NOT EXAMINED]    Constitutional: [x] Appears well-developed and well-nourished [x] No apparent distress      [] Abnormal -     Mental status: [x] Alert and awake  [x] Oriented to person/place/time [x] Able to follow commands    [] Abnormal -     Eyes:   EOM    [x]  Normal    [] Abnormal -   Sclera  [x]  Normal    [] Abnormal -          Discharge [x]  None visible   [] Abnormal -     HENT: [x] Normocephalic, atraumatic  [] Abnormal -   [x] Mouth/Throat: Mucous membranes are moist    External Ears [x] Normal  [] Abnormal -    Neck: [x] No visualized mass [] Abnormal -     Pulmonary/Chest: [x] Respiratory effort normal   [x] No visualized signs of difficulty breathing or respiratory distress        [] Abnormal -      Musculoskeletal:   [] Normal gait with no signs of ataxia         [x] Normal range of motion of neck        [] Abnormal -     Neurological:        [x] No Facial Asymmetry (Cranial nerve 7 motor function) (limited exam due to video visit)          [x] No gaze palsy        [] Abnormal -          Skin:        [x] No significant exanthematous lesions or discoloration noted on facial skin         [] Abnormal -            Psychiatric:       [x] Normal Affect [] Abnormal -        [x] No Hallucinations    Other pertinent observable physical exam findings:seated no distress    We discussed the expected course, resolution and complications of the diagnosis(es) in detail. Medication risks, benefits, costs, interactions, and alternatives were discussed as indicated. I advised her to contact the office if her condition worsens, changes or fails to improve as anticipated. She expressed understanding with the diagnosis(es) and plan.        Alesia Rutherford is a 33 y.o. female who was evaluated by a video visit encounter for concerns as above. Patient identification was verified prior to start of the visit. A caregiver was present when appropriate. Due to this being a TeleHealth encounter (During BKFOI-45 public health emergency), evaluation of the following organ systems was limited: Vitals/Constitutional/EENT/Resp/CV/GI//MS/Neuro/Skin/Heme-Lymph-Imm. Pursuant to the emergency declaration under the [address] [place] [address], [facility] waiver authority and the [employer] and [employer] Act, this Virtual  Visit was conducted, with patient's (and/or legal guardian's) consent, to reduce the patient's risk of exposure to COVID-19 and provide necessary medical care. Services were provided through a video synchronous discussion virtually to substitute for in-person clinic visit. Patient and provider were located at their individual homes. Shelia Banks MD  [facility]  03/07/22 1:17 PM     Portions of this note may have been populated using smart dictation software and may have \"sounds-like\" errors present.

## 2023-05-19 RX ORDER — LEVOTHYROXINE SODIUM 0.1 MG/1
TABLET ORAL
COMMUNITY
Start: 2022-03-07

## 2023-05-19 RX ORDER — NITROFURANTOIN 25; 75 MG/1; MG/1
CAPSULE ORAL
COMMUNITY
Start: 2021-11-22

## 2024-10-07 ENCOUNTER — TELEPHONE (OUTPATIENT)
Age: 34
End: 2024-10-07

## 2024-10-07 NOTE — TELEPHONE ENCOUNTER
----- Message from Nubia PILLAI sent at 10/4/2024  2:27 PM EDT -----  Regarding: [facility] Appointment Request  [facility] Appointment Request    Patient needs appointment for [facility] Appointment Type: New to Provider.    Patient Requested Dates(s): any day as long as there's available appointment  Patient Requested Time: anytime  Provider Name: any female doctor    Reason for Appointment Request: New Patient - Requested Provider unavailable. :  Patient's would like to make an appointment for any female doctor at your office to be her PCP.     --------------------------------------------------------------------------------------------------------------------------    Relationship to Patient: Self     Call Back Information: OK to leave message on voicemail  Preferred Call Back Number: Phone [phone] (home)

## 2024-11-08 ENCOUNTER — OFFICE VISIT (OUTPATIENT)
Age: 34
End: 2024-11-08
Payer: COMMERCIAL

## 2024-11-08 VITALS
WEIGHT: 127 LBS | HEART RATE: 102 BPM | TEMPERATURE: 98.3 F | RESPIRATION RATE: 18 BRPM | DIASTOLIC BLOOD PRESSURE: 72 MMHG | OXYGEN SATURATION: 99 % | BODY MASS INDEX: 22.5 KG/M2 | SYSTOLIC BLOOD PRESSURE: 110 MMHG | HEIGHT: 63 IN

## 2024-11-08 DIAGNOSIS — R00.0 TACHYCARDIA WITH HEART RATE 100-120 BEATS PER MINUTE: Primary | ICD-10-CM

## 2024-11-08 PROCEDURE — 99204 OFFICE O/P NEW MOD 45 MIN: CPT | Performed by: FAMILY MEDICINE

## 2024-11-08 SDOH — HEALTH STABILITY: PHYSICAL HEALTH: ON AVERAGE, HOW MANY MINUTES DO YOU ENGAGE IN EXERCISE AT THIS LEVEL?: 30 MIN

## 2024-11-08 SDOH — HEALTH STABILITY: PHYSICAL HEALTH: ON AVERAGE, HOW MANY DAYS PER WEEK DO YOU ENGAGE IN MODERATE TO STRENUOUS EXERCISE (LIKE A BRISK WALK)?: 4 DAYS

## 2024-11-08 ASSESSMENT — ENCOUNTER SYMPTOMS
ABDOMINAL PAIN: 0
BLOOD IN STOOL: 0
DIARRHEA: 0
NAUSEA: 0
SINUS PRESSURE: 0
CONSTIPATION: 0
COUGH: 0
SINUS PAIN: 0
BACK PAIN: 0
WHEEZING: 0
CHEST TIGHTNESS: 0
SORE THROAT: 0
ANAL BLEEDING: 0
VOMITING: 0
ABDOMINAL DISTENTION: 0
RHINORRHEA: 0
SHORTNESS OF BREATH: 0

## 2024-11-08 ASSESSMENT — PATIENT HEALTH QUESTIONNAIRE - PHQ9
SUM OF ALL RESPONSES TO PHQ9 QUESTIONS 1 & 2: 0
2. FEELING DOWN, DEPRESSED OR HOPELESS: NOT AT ALL
1. LITTLE INTEREST OR PLEASURE IN DOING THINGS: NOT AT ALL
SUM OF ALL RESPONSES TO PHQ QUESTIONS 1-9: 0

## 2024-11-08 NOTE — PROGRESS NOTES
Assessment & Plan   1. Tachycardia with heart rate 100-120 beats per minute       No follow-ups on file.       Subjective   Monique Marcum (:  1990) is a 34 y.o. female,New patient, here for evaluation of the following chief complaint(s):  New Patient      Monique Marcum is a 34 y.o. female presents to [facility] care.     Is postpartum and is breast feeding.     She is still taking her prenatal vitamins, Dr. Christian is monitoring her TSH.     Patient has noted some nail changes, is wondering if it is a toe nail fungus.         Review of Systems   Constitutional:  Negative for activity change, appetite change, fatigue and fever.   HENT:  Negative for congestion, postnasal drip, rhinorrhea, sinus pressure, sinus pain, sneezing and sore throat.    Respiratory:  Negative for cough, chest tightness, shortness of breath and wheezing.    Cardiovascular:  Negative for chest pain, palpitations and leg swelling.   Gastrointestinal:  Negative for abdominal distention, abdominal pain, anal bleeding, blood in stool, constipation, diarrhea, nausea and vomiting.   Endocrine: Negative for cold intolerance, heat intolerance, polydipsia, polyphagia and polyuria.   Genitourinary:  Negative for dyspareunia, genital sores, hematuria, menstrual problem, pelvic pain, vaginal bleeding, vaginal discharge and vaginal pain.   Musculoskeletal:  Negative for arthralgias, back pain, gait problem, joint swelling and neck pain.   Skin:  Negative for pallor, rash and wound.   Allergic/Immunologic: Negative for environmental allergies, food allergies and immunocompromised state.   Hematological:  Negative for adenopathy. Does not bruise/bleed easily.   Psychiatric/Behavioral:  Negative for behavioral problems, decreased concentration, dysphoric mood, self-injury, sleep disturbance and suicidal ideas. The patient is not nervous/anxious.           Objective   Physical Exam  Vitals and nursing note reviewed.   Constitutional:

## 2024-11-08 NOTE — PROGRESS NOTES
Room:  I have reviewed all needed documentation in preparation for visit. Verified patient by name and date of birth  Chief Complaint   Patient presents with    New Patient       Vitals:    11/08/24 1445   BP: 110/72   Pulse: (!) 102   Resp: 18   Temp: 98.3 °F (36.8 °C)   TempSrc: Temporal   SpO2: 99%   Weight: 57.6 kg (127 lb)   Height: 1.6 m (5' 3\")        Health Maintenance Due   Topic Date Due    Depression Screen  Never done    Varicella vaccine (1 of 2 - 13+ 2-dose series) Never done    Hepatitis B vaccine (1 of 3 - 19+ 3-dose series) Never done    DTaP/Tdap/Td vaccine (1 - Tdap) Never done    Flu vaccine (1) 08/01/2024    COVID-19 Vaccine (1 - 2023-24 season) Never done        1. \"Have you been to the ER, urgent care clinic since your last visit?  Hospitalized since your last visit?\" No     2. \"Have you seen or consulted any other health care providers outside of the [facility] since your last visit?\" Yes Dr. noriega